# Patient Record
Sex: MALE | Race: WHITE | NOT HISPANIC OR LATINO | Employment: STUDENT | URBAN - METROPOLITAN AREA
[De-identification: names, ages, dates, MRNs, and addresses within clinical notes are randomized per-mention and may not be internally consistent; named-entity substitution may affect disease eponyms.]

---

## 2021-07-07 ENCOUNTER — OFFICE VISIT (OUTPATIENT)
Dept: INTERNAL MEDICINE | Facility: CLINIC | Age: 26
End: 2021-07-07
Payer: COMMERCIAL

## 2021-07-07 VITALS
OXYGEN SATURATION: 98 % | SYSTOLIC BLOOD PRESSURE: 122 MMHG | DIASTOLIC BLOOD PRESSURE: 86 MMHG | TEMPERATURE: 98 F | WEIGHT: 160.69 LBS | HEART RATE: 59 BPM | HEIGHT: 71 IN | BODY MASS INDEX: 22.5 KG/M2

## 2021-07-07 DIAGNOSIS — F41.9 ANXIETY: ICD-10-CM

## 2021-07-07 DIAGNOSIS — Z12.83 SKIN EXAM FOR MALIGNANT NEOPLASM: ICD-10-CM

## 2021-07-07 DIAGNOSIS — Z00.00 ANNUAL PHYSICAL EXAM: Primary | ICD-10-CM

## 2021-07-07 DIAGNOSIS — Z20.2 STD EXPOSURE: ICD-10-CM

## 2021-07-07 PROCEDURE — 3008F PR BODY MASS INDEX (BMI) DOCUMENTED: ICD-10-PCS | Mod: CPTII,S$GLB,, | Performed by: FAMILY MEDICINE

## 2021-07-07 PROCEDURE — 1126F PR PAIN SEVERITY QUANTIFIED, NO PAIN PRESENT: ICD-10-PCS | Mod: S$GLB,,, | Performed by: FAMILY MEDICINE

## 2021-07-07 PROCEDURE — 99999 PR PBB SHADOW E&M-NEW PATIENT-LVL IV: ICD-10-PCS | Mod: PBBFAC,,, | Performed by: FAMILY MEDICINE

## 2021-07-07 PROCEDURE — 3008F BODY MASS INDEX DOCD: CPT | Mod: CPTII,S$GLB,, | Performed by: FAMILY MEDICINE

## 2021-07-07 PROCEDURE — 99385 PREV VISIT NEW AGE 18-39: CPT | Mod: S$GLB,,, | Performed by: FAMILY MEDICINE

## 2021-07-07 PROCEDURE — 1126F AMNT PAIN NOTED NONE PRSNT: CPT | Mod: S$GLB,,, | Performed by: FAMILY MEDICINE

## 2021-07-07 PROCEDURE — 99385 PR PREVENTIVE VISIT,NEW,18-39: ICD-10-PCS | Mod: S$GLB,,, | Performed by: FAMILY MEDICINE

## 2021-07-07 PROCEDURE — 99999 PR PBB SHADOW E&M-NEW PATIENT-LVL IV: CPT | Mod: PBBFAC,,, | Performed by: FAMILY MEDICINE

## 2021-07-12 ENCOUNTER — LAB VISIT (OUTPATIENT)
Dept: LAB | Facility: OTHER | Age: 26
End: 2021-07-12
Attending: FAMILY MEDICINE
Payer: COMMERCIAL

## 2021-07-12 DIAGNOSIS — Z20.2 STD EXPOSURE: ICD-10-CM

## 2021-07-12 DIAGNOSIS — Z00.00 ANNUAL PHYSICAL EXAM: ICD-10-CM

## 2021-07-12 LAB
ALBUMIN SERPL BCP-MCNC: 4.2 G/DL (ref 3.5–5.2)
ALP SERPL-CCNC: 78 U/L (ref 55–135)
ALT SERPL W/O P-5'-P-CCNC: 20 U/L (ref 10–44)
ANION GAP SERPL CALC-SCNC: 9 MMOL/L (ref 8–16)
AST SERPL-CCNC: 46 U/L (ref 10–40)
BASOPHILS # BLD AUTO: 0.06 K/UL (ref 0–0.2)
BASOPHILS NFR BLD: 1.2 % (ref 0–1.9)
BILIRUB SERPL-MCNC: 1.1 MG/DL (ref 0.1–1)
BUN SERPL-MCNC: 14 MG/DL (ref 6–20)
CALCIUM SERPL-MCNC: 8.9 MG/DL (ref 8.7–10.5)
CHLORIDE SERPL-SCNC: 108 MMOL/L (ref 95–110)
CHOLEST SERPL-MCNC: 127 MG/DL (ref 120–199)
CHOLEST/HDLC SERPL: 2.2 {RATIO} (ref 2–5)
CO2 SERPL-SCNC: 24 MMOL/L (ref 23–29)
CREAT SERPL-MCNC: 1 MG/DL (ref 0.5–1.4)
DIFFERENTIAL METHOD: ABNORMAL
EOSINOPHIL # BLD AUTO: 0.4 K/UL (ref 0–0.5)
EOSINOPHIL NFR BLD: 7.5 % (ref 0–8)
ERYTHROCYTE [DISTWIDTH] IN BLOOD BY AUTOMATED COUNT: 11.7 % (ref 11.5–14.5)
EST. GFR  (AFRICAN AMERICAN): >60 ML/MIN/1.73 M^2
EST. GFR  (NON AFRICAN AMERICAN): >60 ML/MIN/1.73 M^2
GLUCOSE SERPL-MCNC: 87 MG/DL (ref 70–110)
HCT VFR BLD AUTO: 42.2 % (ref 40–54)
HCV AB SERPL QL IA: NEGATIVE
HDLC SERPL-MCNC: 58 MG/DL (ref 40–75)
HDLC SERPL: 45.7 % (ref 20–50)
HGB BLD-MCNC: 13.4 G/DL (ref 14–18)
HIV 1+2 AB+HIV1 P24 AG SERPL QL IA: NEGATIVE
HSV1 IGG SERPL QL IA: NEGATIVE
HSV2 IGG SERPL QL IA: NEGATIVE
IMM GRANULOCYTES # BLD AUTO: 0 K/UL (ref 0–0.04)
IMM GRANULOCYTES NFR BLD AUTO: 0 % (ref 0–0.5)
LDLC SERPL CALC-MCNC: 57.8 MG/DL (ref 63–159)
LYMPHOCYTES # BLD AUTO: 2.1 K/UL (ref 1–4.8)
LYMPHOCYTES NFR BLD: 42.6 % (ref 18–48)
MCH RBC QN AUTO: 27.9 PG (ref 27–31)
MCHC RBC AUTO-ENTMCNC: 31.8 G/DL (ref 32–36)
MCV RBC AUTO: 88 FL (ref 82–98)
MONOCYTES # BLD AUTO: 0.5 K/UL (ref 0.3–1)
MONOCYTES NFR BLD: 10.2 % (ref 4–15)
NEUTROPHILS # BLD AUTO: 1.9 K/UL (ref 1.8–7.7)
NEUTROPHILS NFR BLD: 38.5 % (ref 38–73)
NONHDLC SERPL-MCNC: 69 MG/DL
NRBC BLD-RTO: 0 /100 WBC
PLATELET # BLD AUTO: 194 K/UL (ref 150–450)
PMV BLD AUTO: 10.9 FL (ref 9.2–12.9)
POTASSIUM SERPL-SCNC: 3.8 MMOL/L (ref 3.5–5.1)
PROT SERPL-MCNC: 7 G/DL (ref 6–8.4)
RBC # BLD AUTO: 4.81 M/UL (ref 4.6–6.2)
SODIUM SERPL-SCNC: 141 MMOL/L (ref 136–145)
TRIGL SERPL-MCNC: 56 MG/DL (ref 30–150)
TSH SERPL DL<=0.005 MIU/L-ACNC: 3.21 UIU/ML (ref 0.4–4)
WBC # BLD AUTO: 4.91 K/UL (ref 3.9–12.7)

## 2021-07-12 PROCEDURE — 86592 SYPHILIS TEST NON-TREP QUAL: CPT | Performed by: FAMILY MEDICINE

## 2021-07-12 PROCEDURE — 86694 HERPES SIMPLEX NES ANTBDY: CPT | Performed by: FAMILY MEDICINE

## 2021-07-12 PROCEDURE — 80061 LIPID PANEL: CPT | Performed by: FAMILY MEDICINE

## 2021-07-12 PROCEDURE — 85025 COMPLETE CBC W/AUTO DIFF WBC: CPT | Performed by: FAMILY MEDICINE

## 2021-07-12 PROCEDURE — 36415 COLL VENOUS BLD VENIPUNCTURE: CPT | Performed by: FAMILY MEDICINE

## 2021-07-12 PROCEDURE — 80053 COMPREHEN METABOLIC PANEL: CPT | Performed by: FAMILY MEDICINE

## 2021-07-12 PROCEDURE — 86695 HERPES SIMPLEX TYPE 1 TEST: CPT | Performed by: FAMILY MEDICINE

## 2021-07-12 PROCEDURE — 86803 HEPATITIS C AB TEST: CPT | Performed by: FAMILY MEDICINE

## 2021-07-12 PROCEDURE — 87389 HIV-1 AG W/HIV-1&-2 AB AG IA: CPT | Performed by: FAMILY MEDICINE

## 2021-07-12 PROCEDURE — 86696 HERPES SIMPLEX TYPE 2 TEST: CPT | Performed by: FAMILY MEDICINE

## 2021-07-12 PROCEDURE — 84443 ASSAY THYROID STIM HORMONE: CPT | Performed by: FAMILY MEDICINE

## 2021-07-13 ENCOUNTER — OFFICE VISIT (OUTPATIENT)
Dept: DERMATOLOGY | Facility: CLINIC | Age: 26
End: 2021-07-13
Payer: COMMERCIAL

## 2021-07-13 DIAGNOSIS — Z12.83 SCREENING EXAM FOR SKIN CANCER: Primary | ICD-10-CM

## 2021-07-13 DIAGNOSIS — L81.4 LENTIGO: ICD-10-CM

## 2021-07-13 DIAGNOSIS — Z12.83 SKIN EXAM FOR MALIGNANT NEOPLASM: ICD-10-CM

## 2021-07-13 DIAGNOSIS — L20.82 FLEXURAL ECZEMA: ICD-10-CM

## 2021-07-13 DIAGNOSIS — D18.01 CHERRY ANGIOMA: ICD-10-CM

## 2021-07-13 DIAGNOSIS — D22.9 MULTIPLE BENIGN NEVI: ICD-10-CM

## 2021-07-13 LAB — RPR SER QL: NORMAL

## 2021-07-13 PROCEDURE — 99204 OFFICE O/P NEW MOD 45 MIN: CPT | Mod: S$GLB,,, | Performed by: DERMATOLOGY

## 2021-07-13 PROCEDURE — 99999 PR PBB SHADOW E&M-EST. PATIENT-LVL III: CPT | Mod: PBBFAC,,, | Performed by: DERMATOLOGY

## 2021-07-13 PROCEDURE — 1126F AMNT PAIN NOTED NONE PRSNT: CPT | Mod: CPTII,S$GLB,, | Performed by: DERMATOLOGY

## 2021-07-13 PROCEDURE — 99999 PR PBB SHADOW E&M-EST. PATIENT-LVL III: ICD-10-PCS | Mod: PBBFAC,,, | Performed by: DERMATOLOGY

## 2021-07-13 PROCEDURE — 1126F PR PAIN SEVERITY QUANTIFIED, NO PAIN PRESENT: ICD-10-PCS | Mod: CPTII,S$GLB,, | Performed by: DERMATOLOGY

## 2021-07-13 PROCEDURE — 99204 PR OFFICE/OUTPT VISIT, NEW, LEVL IV, 45-59 MIN: ICD-10-PCS | Mod: S$GLB,,, | Performed by: DERMATOLOGY

## 2021-07-13 RX ORDER — ALBUTEROL SULFATE 90 UG/1
2 AEROSOL, METERED RESPIRATORY (INHALATION) EVERY 4 HOURS PRN
COMMUNITY
End: 2022-08-12 | Stop reason: SDUPTHER

## 2021-07-13 RX ORDER — FLUTICASONE PROPIONATE 0.5 MG/G
CREAM TOPICAL 2 TIMES DAILY
Qty: 45 G | Refills: 1 | Status: SHIPPED | OUTPATIENT
Start: 2021-07-13 | End: 2021-07-23

## 2021-07-16 LAB — HSV AB, IGM BY EIA: 0.16 INDEX

## 2022-07-26 ENCOUNTER — TELEPHONE (OUTPATIENT)
Dept: INTERNAL MEDICINE | Facility: CLINIC | Age: 27
End: 2022-07-26
Payer: COMMERCIAL

## 2022-08-12 ENCOUNTER — OFFICE VISIT (OUTPATIENT)
Dept: PRIMARY CARE CLINIC | Facility: CLINIC | Age: 27
End: 2022-08-12
Payer: COMMERCIAL

## 2022-08-12 ENCOUNTER — LAB VISIT (OUTPATIENT)
Dept: LAB | Facility: HOSPITAL | Age: 27
End: 2022-08-12
Attending: FAMILY MEDICINE
Payer: COMMERCIAL

## 2022-08-12 VITALS
SYSTOLIC BLOOD PRESSURE: 110 MMHG | OXYGEN SATURATION: 98 % | WEIGHT: 162.06 LBS | HEIGHT: 71 IN | DIASTOLIC BLOOD PRESSURE: 64 MMHG | HEART RATE: 52 BPM | BODY MASS INDEX: 22.69 KG/M2

## 2022-08-12 DIAGNOSIS — Z13.6 ENCOUNTER FOR LIPID SCREENING FOR CARDIOVASCULAR DISEASE: ICD-10-CM

## 2022-08-12 DIAGNOSIS — Z11.3 ROUTINE SCREENING FOR STI (SEXUALLY TRANSMITTED INFECTION): ICD-10-CM

## 2022-08-12 DIAGNOSIS — Z13.220 ENCOUNTER FOR LIPID SCREENING FOR CARDIOVASCULAR DISEASE: ICD-10-CM

## 2022-08-12 DIAGNOSIS — Z00.00 GENERAL MEDICAL EXAM: ICD-10-CM

## 2022-08-12 DIAGNOSIS — Z11.4 ENCOUNTER FOR SCREENING FOR HUMAN IMMUNODEFICIENCY VIRUS (HIV): ICD-10-CM

## 2022-08-12 DIAGNOSIS — R41.840 ATTENTION OR CONCENTRATION DEFICIT: ICD-10-CM

## 2022-08-12 DIAGNOSIS — J45.20 MILD INTERMITTENT ASTHMA WITHOUT COMPLICATION: ICD-10-CM

## 2022-08-12 DIAGNOSIS — Z11.59 NEED FOR HEPATITIS C SCREENING TEST: ICD-10-CM

## 2022-08-12 DIAGNOSIS — Z00.00 GENERAL MEDICAL EXAM: Primary | ICD-10-CM

## 2022-08-12 DIAGNOSIS — F39 MOOD DISORDER: ICD-10-CM

## 2022-08-12 LAB
ALBUMIN SERPL BCP-MCNC: 4.3 G/DL (ref 3.5–5.2)
ALP SERPL-CCNC: 69 U/L (ref 55–135)
ALT SERPL W/O P-5'-P-CCNC: 17 U/L (ref 10–44)
ANION GAP SERPL CALC-SCNC: 10 MMOL/L (ref 8–16)
AST SERPL-CCNC: 23 U/L (ref 10–40)
BILIRUB SERPL-MCNC: 1 MG/DL (ref 0.1–1)
BUN SERPL-MCNC: 8 MG/DL (ref 6–20)
CALCIUM SERPL-MCNC: 9.5 MG/DL (ref 8.7–10.5)
CHLORIDE SERPL-SCNC: 105 MMOL/L (ref 95–110)
CHOLEST SERPL-MCNC: 113 MG/DL (ref 120–199)
CHOLEST/HDLC SERPL: 2.1 {RATIO} (ref 2–5)
CO2 SERPL-SCNC: 25 MMOL/L (ref 23–29)
CREAT SERPL-MCNC: 0.8 MG/DL (ref 0.5–1.4)
EST. GFR  (NO RACE VARIABLE): >60 ML/MIN/1.73 M^2
GLUCOSE SERPL-MCNC: 66 MG/DL (ref 70–110)
HDLC SERPL-MCNC: 53 MG/DL (ref 40–75)
HDLC SERPL: 46.9 % (ref 20–50)
LDLC SERPL CALC-MCNC: 49.2 MG/DL (ref 63–159)
NONHDLC SERPL-MCNC: 60 MG/DL
POTASSIUM SERPL-SCNC: 3.9 MMOL/L (ref 3.5–5.1)
PROT SERPL-MCNC: 7 G/DL (ref 6–8.4)
SODIUM SERPL-SCNC: 140 MMOL/L (ref 136–145)
TRIGL SERPL-MCNC: 54 MG/DL (ref 30–150)
TSH SERPL DL<=0.005 MIU/L-ACNC: 2.68 UIU/ML (ref 0.4–4)

## 2022-08-12 PROCEDURE — 36415 COLL VENOUS BLD VENIPUNCTURE: CPT | Mod: PN | Performed by: FAMILY MEDICINE

## 2022-08-12 PROCEDURE — 3074F PR MOST RECENT SYSTOLIC BLOOD PRESSURE < 130 MM HG: ICD-10-PCS | Mod: CPTII,S$GLB,, | Performed by: FAMILY MEDICINE

## 2022-08-12 PROCEDURE — 99999 PR PBB SHADOW E&M-EST. PATIENT-LVL IV: CPT | Mod: PBBFAC,,, | Performed by: FAMILY MEDICINE

## 2022-08-12 PROCEDURE — 3078F DIAST BP <80 MM HG: CPT | Mod: CPTII,S$GLB,, | Performed by: FAMILY MEDICINE

## 2022-08-12 PROCEDURE — 87340 HEPATITIS B SURFACE AG IA: CPT | Performed by: FAMILY MEDICINE

## 2022-08-12 PROCEDURE — 80061 LIPID PANEL: CPT | Performed by: FAMILY MEDICINE

## 2022-08-12 PROCEDURE — 99395 PREV VISIT EST AGE 18-39: CPT | Mod: S$GLB,,, | Performed by: FAMILY MEDICINE

## 2022-08-12 PROCEDURE — 3074F SYST BP LT 130 MM HG: CPT | Mod: CPTII,S$GLB,, | Performed by: FAMILY MEDICINE

## 2022-08-12 PROCEDURE — 86803 HEPATITIS C AB TEST: CPT | Performed by: FAMILY MEDICINE

## 2022-08-12 PROCEDURE — 87389 HIV-1 AG W/HIV-1&-2 AB AG IA: CPT | Performed by: FAMILY MEDICINE

## 2022-08-12 PROCEDURE — 3078F PR MOST RECENT DIASTOLIC BLOOD PRESSURE < 80 MM HG: ICD-10-PCS | Mod: CPTII,S$GLB,, | Performed by: FAMILY MEDICINE

## 2022-08-12 PROCEDURE — 1159F PR MEDICATION LIST DOCUMENTED IN MEDICAL RECORD: ICD-10-PCS | Mod: CPTII,S$GLB,, | Performed by: FAMILY MEDICINE

## 2022-08-12 PROCEDURE — 3008F BODY MASS INDEX DOCD: CPT | Mod: CPTII,S$GLB,, | Performed by: FAMILY MEDICINE

## 2022-08-12 PROCEDURE — 86706 HEP B SURFACE ANTIBODY: CPT | Performed by: FAMILY MEDICINE

## 2022-08-12 PROCEDURE — 86592 SYPHILIS TEST NON-TREP QUAL: CPT | Performed by: FAMILY MEDICINE

## 2022-08-12 PROCEDURE — 1160F RVW MEDS BY RX/DR IN RCRD: CPT | Mod: CPTII,S$GLB,, | Performed by: FAMILY MEDICINE

## 2022-08-12 PROCEDURE — 84443 ASSAY THYROID STIM HORMONE: CPT | Performed by: FAMILY MEDICINE

## 2022-08-12 PROCEDURE — 3008F PR BODY MASS INDEX (BMI) DOCUMENTED: ICD-10-PCS | Mod: CPTII,S$GLB,, | Performed by: FAMILY MEDICINE

## 2022-08-12 PROCEDURE — 99395 PR PREVENTIVE VISIT,EST,18-39: ICD-10-PCS | Mod: S$GLB,,, | Performed by: FAMILY MEDICINE

## 2022-08-12 PROCEDURE — 1159F MED LIST DOCD IN RCRD: CPT | Mod: CPTII,S$GLB,, | Performed by: FAMILY MEDICINE

## 2022-08-12 PROCEDURE — 1160F PR REVIEW ALL MEDS BY PRESCRIBER/CLIN PHARMACIST DOCUMENTED: ICD-10-PCS | Mod: CPTII,S$GLB,, | Performed by: FAMILY MEDICINE

## 2022-08-12 PROCEDURE — 99999 PR PBB SHADOW E&M-EST. PATIENT-LVL IV: ICD-10-PCS | Mod: PBBFAC,,, | Performed by: FAMILY MEDICINE

## 2022-08-12 PROCEDURE — 80053 COMPREHEN METABOLIC PANEL: CPT | Performed by: FAMILY MEDICINE

## 2022-08-12 RX ORDER — BUPROPION HYDROCHLORIDE 150 MG/1
150 TABLET ORAL DAILY
Qty: 7 TABLET | Refills: 0 | Status: SHIPPED | OUTPATIENT
Start: 2022-08-12 | End: 2022-08-19

## 2022-08-12 RX ORDER — BUPROPION HYDROCHLORIDE 300 MG/1
300 TABLET ORAL DAILY
Qty: 30 TABLET | Refills: 1 | Status: SHIPPED | OUTPATIENT
Start: 2022-08-12 | End: 2022-09-07

## 2022-08-12 RX ORDER — ALBUTEROL SULFATE 90 UG/1
2 AEROSOL, METERED RESPIRATORY (INHALATION) EVERY 6 HOURS PRN
Qty: 18 G | Refills: 11 | Status: SHIPPED | OUTPATIENT
Start: 2022-08-12 | End: 2023-06-15 | Stop reason: SDUPTHER

## 2022-08-12 NOTE — PATIENT INSTRUCTIONS
Please call [phone] ([facility]); [phone]( [facility]) to schedule your behavioral health/mental health appointment

## 2022-08-13 LAB — RPR SER QL: NORMAL

## 2022-08-15 ENCOUNTER — TELEPHONE (OUTPATIENT)
Dept: FAMILY MEDICINE | Facility: CLINIC | Age: 27
End: 2022-08-15
Payer: COMMERCIAL

## 2022-08-15 DIAGNOSIS — Z00.00 GENERAL MEDICAL EXAM: Primary | ICD-10-CM

## 2022-08-15 LAB — HIV 1+2 AB+HIV1 P24 AG SERPL QL IA: NEGATIVE

## 2022-08-15 NOTE — TELEPHONE ENCOUNTER
Spoke with lab and patient's CBC from 8/12 was clotted and needs to be re-drawn. Please re-enter order and I will reach out to patient to schedule.

## 2022-08-15 NOTE — TELEPHONE ENCOUNTER
----- Message from Paul Matute Jr., MD sent at 8/15/2022  2:26 PM CDT -----  Please schedule patient virtual visit in 6-8 weeks for attention follow up

## 2022-08-15 NOTE — TELEPHONE ENCOUNTER
----- Message from Chuyita Powell sent at 8/15/2022  2:01 AM CDT -----  Regarding: Lab Client Services  Contact: [phone]  Good Morning,     My name is Chuyita Powell I work in the Lab Client Services. We had a problem with some lab work on this patient. If someone from your office could call us at [phone] or ext. [phone] that would be great. Anyone in my department can help.      Thank you

## 2022-08-15 NOTE — PROGRESS NOTES
Subjective:       Patient ID: Arnel Edmond is a 26 y.o. male.    Chief Complaint: Annual Exam    HPI   26-year-old male comes into establish care with new PCP. He reports no chronic conditions. He reports he does not take any medication at present.  He does report that he has been having issues with focusing. He states it has been a long time issue. He has never been evaluated for attention deficit disorder. He states that his mother was recently diagnosed with this. In addition he does report episodes of low mood and episodes of anxiety. He's never been on any treatment for these either.     Review of Systems   Constitutional: Negative for unexpected weight change.   HENT: Negative for ear pain and sore throat.    Eyes: Negative for visual disturbance.   Respiratory: Negative for shortness of breath.    Cardiovascular: Negative for chest pain.   Gastrointestinal: Negative for abdominal pain and blood in stool.   Endocrine: Negative for cold intolerance and heat intolerance.   Genitourinary: Negative for dysuria and frequency.   Integumentary:  Negative for rash.   Neurological: Negative for weakness, numbness and headaches.   Hematological: Negative for adenopathy.   Psychiatric/Behavioral: Positive for decreased concentration and dysphoric mood. Negative for self-injury and suicidal ideas. The patient is nervous/anxious. The patient is not hyperactive.          Objective:      Physical Exam  Vitals reviewed.   Constitutional:       General: He is not in acute distress.  HENT:      Head: Normocephalic and atraumatic.      Right Ear: Ear canal and external ear normal.      Left Ear: Ear canal and external ear normal.      Nose: Nose normal.      Mouth/Throat:      Mouth: Mucous membranes are moist.      Pharynx: No oropharyngeal exudate or posterior oropharyngeal erythema.   Eyes:      Extraocular Movements: Extraocular movements intact.      Conjunctiva/sclera: Conjunctivae normal.      Pupils: Pupils are  equal, round, and reactive to light.   Cardiovascular:      Rate and Rhythm: Normal rate and regular rhythm.      Pulses: Normal pulses.      Heart sounds: Normal heart sounds.   Pulmonary:      Effort: Pulmonary effort is normal. No respiratory distress.      Breath sounds: No wheezing or rales.   Abdominal:      General: Abdomen is flat. Bowel sounds are normal. There is no distension.      Palpations: Abdomen is soft.      Tenderness: There is no abdominal tenderness. There is no guarding.   Musculoskeletal:      Cervical back: Normal range of motion. No rigidity or tenderness.   Lymphadenopathy:      Cervical: No cervical adenopathy.   Skin:     General: Skin is warm.      Capillary Refill: Capillary refill takes less than 2 seconds.   Neurological:      Mental Status: He is alert and oriented to person, place, and time.      Cranial Nerves: No cranial nerve deficit.      Sensory: No sensory deficit.   Psychiatric:         Mood and Affect: Mood normal.         Behavior: Behavior normal.         Assessment:       Problem List Items Addressed This Visit    None     Visit Diagnoses     General medical exam    -  Primary    Relevant Orders    Comprehensive Metabolic Panel (Completed)    Urinalysis (Completed)    Encounter for lipid screening for cardiovascular disease        Relevant Orders    Lipid Panel (Completed)    Encounter for screening for human immunodeficiency virus (HIV)        Relevant Orders    HIV 1/2 Ag/Ab (4th Gen) (Completed)    Need for hepatitis C screening test        Relevant Orders    Hepatitis C Antibody    Routine screening for STI (sexually transmitted infection)        Relevant Orders    C. trachomatis/N. gonorrhoeae by AMP DNA [facility]; Urine    RPR (Completed)    Hepatitis B Surface Ab, Qualitative    Hepatitis B Surface Antigen    Mild intermittent asthma without complication        Relevant Medications    albuterol (PROAIR HFA) 90 mcg/actuation inhaler    Attention or concentration  deficit        Relevant Medications    buPROPion (WELLBUTRIN XL) 150 MG TB24 tablet    buPROPion (WELLBUTRIN XL) 300 MG 24 hr tablet    Other Relevant Orders    TSH (Completed)    Ambulatory referral/consult to Psychiatry    Mood disorder        Relevant Medications    buPROPion (WELLBUTRIN XL) 150 MG TB24 tablet    buPROPion (WELLBUTRIN XL) 300 MG 24 hr tablet          Plan:       Arnel was seen today for annual exam.    Diagnoses and all orders for this visit:    General medical exam  -     Comprehensive Metabolic Panel; Future  -     Cancel: CBC Auto Differential; Future  -     Urinalysis; Future  Age appropriate recommendations reviewed.  Screening labs ordered.    Encounter for lipid screening for cardiovascular disease  -     Lipid Panel; Future  Screening lipid panel ordered.    Encounter for screening for human immunodeficiency virus (HIV)  -     HIV 1/2 Ag/Ab (4th Gen); Future  Screen for HIV as per USPSTF guidelines    Need for hepatitis C screening test  -     Hepatitis C Antibody; Future  Screen for HCV as per USPSTF guidelines    Routine screening for STI (sexually transmitted infection)  -     C. trachomatis/N. gonorrhoeae by AMP DNA [facility]; Urine; Future  -     RPR; Future  -     Hepatitis B Surface Ab, Qualitative; Future  -     Hepatitis B Surface Antigen; Future  Screen for STI    Mild intermittent asthma without complication  -     albuterol (PROAIR HFA) 90 mcg/actuation inhaler; Inhale 2 puffs into the lungs every 6 (six) hours as needed for Wheezing. Rescue  Patient educated on correct use of inhaler via video: https://www.[employer].com/watch?v=[email or web address].  Patient was able to explain back how to use inhaler.    Attention or concentration deficit/Mood disorder  -     TSH; Future  -     buPROPion (WELLBUTRIN XL) 150 MG TB24 tablet; Take 1 tablet (150 mg total) by mouth once daily. Then increase to 300 mg daily for 7 days  -     buPROPion (WELLBUTRIN XL) 300 MG 24 hr tablet; Take 1 tablet (300 mg  total) by mouth once daily.  -     Ambulatory referral/consult to Psychiatry; Future  Discussed that diagnosis of ADHD requires testing  Referral to psych placed  In the meantime discussed starting wellbutrin for symptom management.

## 2022-08-16 LAB
HBV SURFACE AB SER-ACNC: NEGATIVE M[IU]/ML
HBV SURFACE AG SERPL QL IA: NEGATIVE
HCV AB SERPL QL IA: NEGATIVE

## 2022-08-25 ENCOUNTER — LAB VISIT (OUTPATIENT)
Dept: LAB | Facility: HOSPITAL | Age: 27
End: 2022-08-25
Attending: FAMILY MEDICINE
Payer: COMMERCIAL

## 2022-08-25 DIAGNOSIS — Z00.00 GENERAL MEDICAL EXAM: ICD-10-CM

## 2022-08-25 LAB
BASOPHILS # BLD AUTO: 0.06 K/UL (ref 0–0.2)
BASOPHILS NFR BLD: 1.3 % (ref 0–1.9)
DIFFERENTIAL METHOD: NORMAL
EOSINOPHIL # BLD AUTO: 0.3 K/UL (ref 0–0.5)
EOSINOPHIL NFR BLD: 7.5 % (ref 0–8)
ERYTHROCYTE [DISTWIDTH] IN BLOOD BY AUTOMATED COUNT: 11.9 % (ref 11.5–14.5)
HCT VFR BLD AUTO: 42.7 % (ref 40–54)
HGB BLD-MCNC: 14.3 G/DL (ref 14–18)
IMM GRANULOCYTES # BLD AUTO: 0.01 K/UL (ref 0–0.04)
IMM GRANULOCYTES NFR BLD AUTO: 0.2 % (ref 0–0.5)
LYMPHOCYTES # BLD AUTO: 1.6 K/UL (ref 1–4.8)
LYMPHOCYTES NFR BLD: 34.8 % (ref 18–48)
MCH RBC QN AUTO: 28.8 PG (ref 27–31)
MCHC RBC AUTO-ENTMCNC: 33.5 G/DL (ref 32–36)
MCV RBC AUTO: 86 FL (ref 82–98)
MONOCYTES # BLD AUTO: 0.4 K/UL (ref 0.3–1)
MONOCYTES NFR BLD: 7.7 % (ref 4–15)
NEUTROPHILS # BLD AUTO: 2.2 K/UL (ref 1.8–7.7)
NEUTROPHILS NFR BLD: 48.5 % (ref 38–73)
NRBC BLD-RTO: 0 /100 WBC
PLATELET # BLD AUTO: 198 K/UL (ref 150–450)
PMV BLD AUTO: 11.3 FL (ref 9.2–12.9)
RBC # BLD AUTO: 4.97 M/UL (ref 4.6–6.2)
WBC # BLD AUTO: 4.54 K/UL (ref 3.9–12.7)

## 2022-08-25 PROCEDURE — 36415 COLL VENOUS BLD VENIPUNCTURE: CPT | Mod: PN | Performed by: FAMILY MEDICINE

## 2022-08-25 PROCEDURE — 85025 COMPLETE CBC W/AUTO DIFF WBC: CPT | Performed by: FAMILY MEDICINE

## 2022-08-27 DIAGNOSIS — Z23 NEED FOR VACCINATION: Primary | ICD-10-CM

## 2022-08-27 DIAGNOSIS — Z11.3 ROUTINE SCREENING FOR STI (SEXUALLY TRANSMITTED INFECTION): Primary | ICD-10-CM

## 2022-08-27 RX ORDER — HEPATITIS B VACCINE (RECOMBINANT) ADJUVANTED 20 UG/.5ML
INJECTION, SOLUTION INTRAMUSCULAR
Qty: 0.5 ML | Refills: 1 | Status: SHIPPED | OUTPATIENT
Start: 2022-08-27 | End: 2022-10-19 | Stop reason: SDUPTHER

## 2022-10-19 ENCOUNTER — OFFICE VISIT (OUTPATIENT)
Dept: FAMILY MEDICINE | Facility: CLINIC | Age: 27
End: 2022-10-19
Payer: COMMERCIAL

## 2022-10-19 DIAGNOSIS — F39 MOOD DISORDER: ICD-10-CM

## 2022-10-19 DIAGNOSIS — R41.840 ATTENTION OR CONCENTRATION DEFICIT: Primary | ICD-10-CM

## 2022-10-19 DIAGNOSIS — Z23 NEED FOR VACCINATION: ICD-10-CM

## 2022-10-19 PROCEDURE — 99214 PR OFFICE/OUTPT VISIT, EST, LEVL IV, 30-39 MIN: ICD-10-PCS | Mod: 95,,, | Performed by: FAMILY MEDICINE

## 2022-10-19 PROCEDURE — 99214 OFFICE O/P EST MOD 30 MIN: CPT | Mod: 95,,, | Performed by: FAMILY MEDICINE

## 2022-10-19 PROCEDURE — 1159F PR MEDICATION LIST DOCUMENTED IN MEDICAL RECORD: ICD-10-PCS | Mod: CPTII,95,, | Performed by: FAMILY MEDICINE

## 2022-10-19 PROCEDURE — 1160F RVW MEDS BY RX/DR IN RCRD: CPT | Mod: CPTII,95,, | Performed by: FAMILY MEDICINE

## 2022-10-19 PROCEDURE — 1160F PR REVIEW ALL MEDS BY PRESCRIBER/CLIN PHARMACIST DOCUMENTED: ICD-10-PCS | Mod: CPTII,95,, | Performed by: FAMILY MEDICINE

## 2022-10-19 PROCEDURE — 1159F MED LIST DOCD IN RCRD: CPT | Mod: CPTII,95,, | Performed by: FAMILY MEDICINE

## 2022-10-19 RX ORDER — BUPROPION HYDROCHLORIDE 300 MG/1
300 TABLET ORAL DAILY
Qty: 90 TABLET | Refills: 1 | Status: SHIPPED | OUTPATIENT
Start: 2022-10-19 | End: 2023-06-10

## 2022-10-19 RX ORDER — HEPATITIS B VACCINE (RECOMBINANT) ADJUVANTED 20 UG/.5ML
INJECTION, SOLUTION INTRAMUSCULAR
Qty: 0.5 ML | Refills: 1 | Status: SHIPPED | OUTPATIENT
Start: 2022-10-19 | End: 2023-09-19 | Stop reason: SDUPTHER

## 2022-10-23 PROBLEM — R41.840 ATTENTION OR CONCENTRATION DEFICIT: Status: ACTIVE | Noted: 2022-10-23

## 2022-10-23 PROBLEM — F39 MOOD DISORDER: Status: ACTIVE | Noted: 2022-10-23

## 2022-10-23 NOTE — PROGRESS NOTES
The patient location is: [place], [place]  The chief complaint leading to consultation is: ADD/Mood    Visit type: audiovisual    Face to Face time with patient: 12 minutes  15 minutes of total time spent on the encounter, which includes face to face time and non-face to face time preparing to see the patient (eg, review of tests), Obtaining and/or reviewing separately obtained history, Documenting clinical information in the electronic or other health record, Independently interpreting results (not separately reported) and communicating results to the patient/family/caregiver, or Care coordination (not separately reported).         Each patient to whom he or she provides medical services by telemedicine is:  (1) informed of the relationship between the physician and patient and the respective role of any other health care provider with respect to management of the patient; and (2) notified that he or she may decline to receive medical services by telemedicine and may withdraw from such care at any time.    Notes:   Subjective:       Patient ID: Arnel Edmond is a 26 y.o. male.    Chief Complaint: ADHD    26 year old male makes a virtual visit to follow up on attention deficit and mood. States he has been taking Wellbutrin and he feels he is doing much better. States it has not completely solved concentration issues but he is now able to do what he can to manage his attention deficit. Also reports mood is much better. States others have also noticed these changes. He denies any side effects.        Review of Systems   Constitutional:  Negative for activity change and unexpected weight change.   HENT:  Negative for hearing loss, rhinorrhea and trouble swallowing.    Eyes:  Negative for discharge and visual disturbance.   Respiratory:  Negative for chest tightness and wheezing.    Cardiovascular:  Negative for chest pain and palpitations.   Gastrointestinal:  Negative for blood in stool, constipation, diarrhea and  vomiting.   Endocrine: Negative for polydipsia and polyuria.   Genitourinary:  Negative for difficulty urinating, hematuria and urgency.   Musculoskeletal:  Negative for arthralgias, joint swelling and neck pain.   Neurological:  Negative for weakness and headaches.   Psychiatric/Behavioral:  Negative for confusion and dysphoric mood.        Objective:      Physical Exam  Pulmonary:      Effort: Pulmonary effort is normal.   Neurological:      Mental Status: He is alert.   Psychiatric:         Mood and Affect: Mood normal.         Behavior: Behavior normal.         Thought Content: Thought content normal.         Cognition and Memory: Cognition normal.       No visits with results within 1 Month(s) from this visit.   Latest known visit with results is:   Lab Visit on 08/25/2022   Component Date Value Ref Range Status    WBC 08/25/2022 4.54  3.90 - 12.70 K/uL Final    RBC 08/25/2022 4.97  4.60 - 6.20 M/uL Final    Hemoglobin 08/25/2022 14.3  14.0 - 18.0 g/dL Final    Hematocrit 08/25/2022 42.7  40.0 - 54.0 % Final    MCV 08/25/2022 86  82 - 98 fL Final    MCH 08/25/2022 28.8  27.0 - 31.0 pg Final    MCHC 08/25/2022 33.5  32.0 - 36.0 g/dL Final    RDW 08/25/2022 11.9  11.5 - 14.5 % Final    Platelets 08/25/2022 198  150 - 450 K/uL Final    MPV 08/25/2022 11.3  9.2 - 12.9 fL Final    Immature Granulocytes 08/25/2022 0.2  0.0 - 0.5 % Final    Gran # (ANC) 08/25/2022 2.2  1.8 - 7.7 K/uL Final    Immature Grans (Abs) 08/25/2022 0.01  0.00 - 0.04 K/uL Final    Comment: Mild elevation in immature granulocytes is non specific and   can be seen in a variety of conditions including stress response,   acute inflammation, trauma and pregnancy. Correlation with other   laboratory and clinical findings is essential.      Lymph # 08/25/2022 1.6  1.0 - 4.8 K/uL Final    Mono # 08/25/2022 0.4  0.3 - 1.0 K/uL Final    Eos # 08/25/2022 0.3  0.0 - 0.5 K/uL Final    Baso # 08/25/2022 0.06  0.00 - 0.20 K/uL Final    nRBC 08/25/2022 0  0  /100 WBC Final    Gran % 08/25/2022 48.5  38.0 - 73.0 % Final    Lymph % 08/25/2022 34.8  18.0 - 48.0 % Final    Mono % 08/25/2022 7.7  4.0 - 15.0 % Final    Eosinophil % 08/25/2022 7.5  0.0 - 8.0 % Final    Basophil % 08/25/2022 1.3  0.0 - 1.9 % Final    Differential Method 08/25/2022 Automated   Final       Assessment:       Problem List Items Addressed This Visit    None  Visit Diagnoses       Attention or concentration deficit    -  Primary    Relevant Medications    buPROPion (WELLBUTRIN XL) 300 MG 24 hr tablet    Mood disorder        Relevant Medications    buPROPion (WELLBUTRIN XL) 300 MG 24 hr tablet    Need for vaccination        Relevant Medications    hepatitis B vacc-CpG 1018, PF, (HEPLISAV-B, PF,) 20 mcg/0.5 mL Syrg              Plan:       Arnel was seen today for adhd.    Diagnoses and all orders for this visit:    Attention or concentration deficit/Mood disorder  -     buPROPion (WELLBUTRIN XL) 300 MG 24 hr tablet; Take 1 tablet (300 mg total) by mouth once daily.  Doing much better.   We discussed staying on current dose or potentially increasing to 450 mg  States he would like to stay on current dose.    Need for vaccination  -     hepatitis B vacc-CpG 1018, PF, (HEPLISAV-B, PF,) 20 mcg/0.5 mL Syrg; 0.5 ml IM x 2 doses at 0 and 1-2 months  -     hpv vaccine,9-sushil (GARDASIL 9, PF,) 0.5 mL Syrg; Inject 0.5 mLs into the muscle once. for 1 dose  Recommended Heplisav series- Rx sent to pharmacy  Also needs last HPV dose- Rx sent

## 2023-06-10 DIAGNOSIS — F39 MOOD DISORDER: ICD-10-CM

## 2023-06-10 DIAGNOSIS — R41.840 ATTENTION OR CONCENTRATION DEFICIT: ICD-10-CM

## 2023-06-10 RX ORDER — BUPROPION HYDROCHLORIDE 300 MG/1
TABLET ORAL
Qty: 90 TABLET | Refills: 0 | Status: SHIPPED | OUTPATIENT
Start: 2023-06-10 | End: 2023-09-13

## 2023-06-10 NOTE — TELEPHONE ENCOUNTER
Refill Decision Note   Arnelrebekah Estevezfield  is requesting a refill authorization.  Brief Assessment and Rationale for Refill:  Approve     Medication Therapy Plan:         Comments:     Note composed:12:40 PM 06/10/2023

## 2023-06-10 NOTE — TELEPHONE ENCOUNTER
No care due was identified.  [facility] Embedded Care Due Messages. Reference number: [id number].   6/10/2023 8:49:47 AM CDT

## 2023-06-15 ENCOUNTER — OFFICE VISIT (OUTPATIENT)
Dept: INTERNAL MEDICINE | Facility: CLINIC | Age: 28
End: 2023-06-15
Payer: COMMERCIAL

## 2023-06-15 DIAGNOSIS — J45.20 MILD INTERMITTENT ASTHMA WITHOUT COMPLICATION: ICD-10-CM

## 2023-06-15 PROCEDURE — 99214 OFFICE O/P EST MOD 30 MIN: CPT | Mod: 95,,, | Performed by: STUDENT IN AN ORGANIZED HEALTH CARE EDUCATION/TRAINING PROGRAM

## 2023-06-15 PROCEDURE — 99214 PR OFFICE/OUTPT VISIT, EST, LEVL IV, 30-39 MIN: ICD-10-PCS | Mod: 95,,, | Performed by: STUDENT IN AN ORGANIZED HEALTH CARE EDUCATION/TRAINING PROGRAM

## 2023-06-15 RX ORDER — IPRATROPIUM BROMIDE AND ALBUTEROL SULFATE 2.5; .5 MG/3ML; MG/3ML
3 SOLUTION RESPIRATORY (INHALATION) EVERY 6 HOURS PRN
Qty: 75 ML | Refills: 0 | Status: SHIPPED | OUTPATIENT
Start: 2023-06-15 | End: 2024-06-14

## 2023-06-15 RX ORDER — PREDNISONE 20 MG/1
20 TABLET ORAL 2 TIMES DAILY
Qty: 8 TABLET | Refills: 0 | Status: SHIPPED | OUTPATIENT
Start: 2023-06-15 | End: 2023-06-19

## 2023-06-15 RX ORDER — ALBUTEROL SULFATE 90 UG/1
2 AEROSOL, METERED RESPIRATORY (INHALATION) EVERY 6 HOURS PRN
Qty: 18 G | Refills: 1 | Status: SHIPPED | OUTPATIENT
Start: 2023-06-15

## 2023-06-15 NOTE — PROGRESS NOTES
Subjective:      Chief Complaint: Cough     Cough  This is a new problem. The current episode started 1 to 4 weeks ago. The problem has been waxing and waning. The problem occurs every few minutes. The cough is Non-productive. Associated symptoms include shortness of breath. Pertinent negatives include no chest pain, chills, ear congestion, ear pain, fever, headaches, heartburn, hemoptysis, myalgias, nasal congestion, postnasal drip, rash, rhinorrhea, sore throat, sweats, weight loss or wheezing. Nothing aggravates the symptoms. Risk factors for lung disease include animal exposure. He has tried OTC cough suppressant and a beta-agonist inhaler for the symptoms. The treatment provided mild relief. His past medical history is significant for asthma and environmental allergies. There is no history of bronchiectasis, bronchitis, COPD, emphysema or pneumonia.   The patient location is:  patient's home  The chief complaint leading to consultation is: cough   Visit type: Virtual visit with synchronous audio and video  Total time spent with patient: 15 mins   Each patient to whom he or she provides medical services by telemedicine is:  (1) informed of the relationship between the physician and patient and the respective role of any other health care provider with respect to management of the patient; and (2) notified that he or she may decline to receive medical services by telemedicine and may withdraw from such care at any time.  Patient agreed to this telemedicine visit today in an effort to avoid face-to-face visits due to the current COVID 19 pandemic.    Mr. Arnel Edmond is a 28 yo M, new to me but established with a colleague, with childhood asthma presenting via telemedicine for evaluation of persistent cough:     Cough:  -  reports having had  nonspecific viral URI symptoms ( laryngitis, pharyngitis, cough, etc.) approximately 2 weeks ago which have all resolved with exception of persistent nonproductive cough   for which he is requesting empiric treatment  -  does have a history of childhood asthma but is not currently reporting shortness of breath or wheezing; that said, his symptoms are improving with use of albuterol inhaler (several years old)    Review of Systems   Constitutional:  Negative for chills, fever and weight loss.   HENT:  Negative for ear pain, postnasal drip, rhinorrhea and sore throat.    Respiratory:  Positive for cough and shortness of breath. Negative for hemoptysis and wheezing.    Cardiovascular:  Negative for chest pain.   Gastrointestinal:  Negative for heartburn.   Musculoskeletal:  Negative for myalgias.   Integumentary:  Negative for rash.   Allergic/Immunologic: Positive for environmental allergies.   Neurological:  Negative for headaches.       Objective:      There were no vitals filed for this visit.   Physical Exam  Vitals reviewed: Unable to obtain given constraints of telemedicine.     Current Outpatient Medications on File Prior to Visit   Medication Sig Dispense Refill    buPROPion (WELLBUTRIN XL) 300 MG 24 hr tablet TAKE 1 TABLET BY MOUTH EVERY DAY 90 tablet 0    fluticasone propionate (CUTIVATE) 0.05 % cream Apply topically 2 (two) times daily. for 10 days (Patient not taking: Reported on 8/12/2022) 45 g 1    hepatitis B vacc-CpG 1018, PF, (HEPLISAV-B, PF,) 20 mcg/0.5 mL Syrg 0.5 ml IM x 2 doses at 0 and 1-2 months 0.5 mL 1    [DISCONTINUED] albuterol (PROAIR HFA) 90 mcg/actuation inhaler Inhale 2 puffs into the lungs every 6 (six) hours as needed for Wheezing. Rescue 18 g 11     No current facility-administered medications on file prior to visit.         Assessment:       1. Mild intermittent asthma without complication        Plan:       Mild intermittent asthma without complication  -     albuterol (PROAIR HFA) 90 mcg/actuation inhaler; Inhale 2 puffs into the lungs every 6 (six) hours as needed for Wheezing. Rescue  Dispense: 18 g; Refill: 1   -  presumed secondary to residual  viral inflammation +/-  triggering underlying asthmatic episode    - Treat empirically with course of oral steroids and p.r.n. albuterol/ ipratropium ( metered-dose inhaler and nebulizer solution respectively).     - If symptoms are not sufficiently suppressed/resolved within 1 week, will require more formal evaluation ( in-person auscultation and imaging)     Other orders  -     predniSONE (DELTASONE) 20 MG tablet; Take 1 tablet (20 mg total) by mouth 2 (two) times daily. for 4 days  Dispense: 8 tablet; Refill: 0  -     albuterol-ipratropium (DUO-NEB) 2.5 mg-0.5 mg/3 mL nebulizer solution; Take 3 mLs by nebulization every 6 (six) hours as needed for Wheezing. Rescue  Dispense: 75 mL; Refill: 0

## 2023-06-21 ENCOUNTER — TELEPHONE (OUTPATIENT)
Dept: FAMILY MEDICINE | Facility: CLINIC | Age: 28
End: 2023-06-21
Payer: COMMERCIAL

## 2023-06-22 NOTE — TELEPHONE ENCOUNTER
One time refill on Wellbutrin sent in.  However patient will need to be seen in August/Sept for annual for further continuation.  Our please let patient know that I am no longer working at [facility] and if he would like to continue with me would need to schedule in person in [place].  Otherwise can schedule appointment with available provider in the [place].

## 2023-09-13 DIAGNOSIS — F39 MOOD DISORDER: ICD-10-CM

## 2023-09-13 DIAGNOSIS — R41.840 ATTENTION OR CONCENTRATION DEFICIT: ICD-10-CM

## 2023-09-13 RX ORDER — BUPROPION HYDROCHLORIDE 300 MG/1
TABLET ORAL
Qty: 30 TABLET | Refills: 0 | Status: SHIPPED | OUTPATIENT
Start: 2023-09-13 | End: 2023-09-19 | Stop reason: SDUPTHER

## 2023-09-13 NOTE — TELEPHONE ENCOUNTER
No care due was identified.  [facility] Embedded Care Due Messages. Reference number: [id number].   9/13/2023 8:32:43 AM CDT

## 2023-09-13 NOTE — TELEPHONE ENCOUNTER
Refill Routing Note   Medication(s) are not appropriate for processing by [facility] for the following reason(s):      Required vitals outdated    [facility] action(s):  Defer Care Due:  None identified            Appointments  past 12m or future 3m with PCP    Date Provider   Last Visit   10/19/2022 Paul Matute Jr., MD   Next Visit   9/19/2023 Paul Matute Jr., MD   ED visits in past 90 days: 0        Note composed:10:47 AM 09/13/2023

## 2023-09-14 NOTE — TELEPHONE ENCOUNTER
Patient has not seen me in 11 months.  Thirty day refill sent.  We will need to make an appointment prior to the 30 days ending. Can be virtual

## 2023-09-14 NOTE — TELEPHONE ENCOUNTER
Left message for pt 30 supply of requested medication was sent to the pharmacy, but he needs an appt before he runs out and we can do a virtual appt.

## 2023-09-19 ENCOUNTER — OFFICE VISIT (OUTPATIENT)
Dept: FAMILY MEDICINE | Facility: CLINIC | Age: 28
End: 2023-09-19
Payer: COMMERCIAL

## 2023-09-19 VITALS
DIASTOLIC BLOOD PRESSURE: 70 MMHG | OXYGEN SATURATION: 97 % | BODY MASS INDEX: 22.72 KG/M2 | TEMPERATURE: 100 F | HEART RATE: 86 BPM | RESPIRATION RATE: 18 BRPM | WEIGHT: 162.25 LBS | HEIGHT: 71 IN | SYSTOLIC BLOOD PRESSURE: 128 MMHG

## 2023-09-19 DIAGNOSIS — Z23 NEED FOR VACCINATION: ICD-10-CM

## 2023-09-19 DIAGNOSIS — Z13.220 ENCOUNTER FOR LIPID SCREENING FOR CARDIOVASCULAR DISEASE: ICD-10-CM

## 2023-09-19 DIAGNOSIS — Z11.3 ROUTINE SCREENING FOR STI (SEXUALLY TRANSMITTED INFECTION): ICD-10-CM

## 2023-09-19 DIAGNOSIS — Z11.4 ENCOUNTER FOR SCREENING FOR HIV: ICD-10-CM

## 2023-09-19 DIAGNOSIS — R41.840 ATTENTION OR CONCENTRATION DEFICIT: ICD-10-CM

## 2023-09-19 DIAGNOSIS — F39 MOOD DISORDER: ICD-10-CM

## 2023-09-19 DIAGNOSIS — Z13.6 ENCOUNTER FOR LIPID SCREENING FOR CARDIOVASCULAR DISEASE: ICD-10-CM

## 2023-09-19 DIAGNOSIS — A08.4 VIRAL GASTROENTERITIS: ICD-10-CM

## 2023-09-19 DIAGNOSIS — Z00.00 GENERAL MEDICAL EXAM: Primary | ICD-10-CM

## 2023-09-19 DIAGNOSIS — R21 RASH: ICD-10-CM

## 2023-09-19 PROCEDURE — 99395 PR PREVENTIVE VISIT,EST,18-39: ICD-10-PCS | Mod: S$GLB,,, | Performed by: FAMILY MEDICINE

## 2023-09-19 PROCEDURE — 3074F PR MOST RECENT SYSTOLIC BLOOD PRESSURE < 130 MM HG: ICD-10-PCS | Mod: CPTII,S$GLB,, | Performed by: FAMILY MEDICINE

## 2023-09-19 PROCEDURE — 3074F SYST BP LT 130 MM HG: CPT | Mod: CPTII,S$GLB,, | Performed by: FAMILY MEDICINE

## 2023-09-19 PROCEDURE — 1159F MED LIST DOCD IN RCRD: CPT | Mod: CPTII,S$GLB,, | Performed by: FAMILY MEDICINE

## 2023-09-19 PROCEDURE — 99999 PR PBB SHADOW E&M-EST. PATIENT-LVL IV: ICD-10-PCS | Mod: PBBFAC,,, | Performed by: FAMILY MEDICINE

## 2023-09-19 PROCEDURE — 3078F DIAST BP <80 MM HG: CPT | Mod: CPTII,S$GLB,, | Performed by: FAMILY MEDICINE

## 2023-09-19 PROCEDURE — 1160F RVW MEDS BY RX/DR IN RCRD: CPT | Mod: CPTII,S$GLB,, | Performed by: FAMILY MEDICINE

## 2023-09-19 PROCEDURE — 3078F PR MOST RECENT DIASTOLIC BLOOD PRESSURE < 80 MM HG: ICD-10-PCS | Mod: CPTII,S$GLB,, | Performed by: FAMILY MEDICINE

## 2023-09-19 PROCEDURE — 3008F BODY MASS INDEX DOCD: CPT | Mod: CPTII,S$GLB,, | Performed by: FAMILY MEDICINE

## 2023-09-19 PROCEDURE — 99395 PREV VISIT EST AGE 18-39: CPT | Mod: S$GLB,,, | Performed by: FAMILY MEDICINE

## 2023-09-19 PROCEDURE — 99999 PR PBB SHADOW E&M-EST. PATIENT-LVL IV: CPT | Mod: PBBFAC,,, | Performed by: FAMILY MEDICINE

## 2023-09-19 PROCEDURE — 3008F PR BODY MASS INDEX (BMI) DOCUMENTED: ICD-10-PCS | Mod: CPTII,S$GLB,, | Performed by: FAMILY MEDICINE

## 2023-09-19 PROCEDURE — 1160F PR REVIEW ALL MEDS BY PRESCRIBER/CLIN PHARMACIST DOCUMENTED: ICD-10-PCS | Mod: CPTII,S$GLB,, | Performed by: FAMILY MEDICINE

## 2023-09-19 PROCEDURE — 1159F PR MEDICATION LIST DOCUMENTED IN MEDICAL RECORD: ICD-10-PCS | Mod: CPTII,S$GLB,, | Performed by: FAMILY MEDICINE

## 2023-09-19 RX ORDER — BUPROPION HYDROCHLORIDE 300 MG/1
300 TABLET ORAL DAILY
Qty: 90 TABLET | Refills: 3 | Status: SHIPPED | OUTPATIENT
Start: 2023-09-19

## 2023-09-19 RX ORDER — NAFTIFINE HYDROCHLORIDE 20 MG/G
CREAM TOPICAL
Qty: 60 G | Refills: 0 | Status: SHIPPED | OUTPATIENT
Start: 2023-09-19

## 2023-09-19 RX ORDER — DIPHENOXYLATE HYDROCHLORIDE AND ATROPINE SULFATE 2.5; .025 MG/1; MG/1
2 TABLET ORAL 4 TIMES DAILY PRN
Qty: 30 TABLET | Refills: 0 | Status: SHIPPED | OUTPATIENT
Start: 2023-09-19 | End: 2023-09-29

## 2023-09-19 RX ORDER — HEPATITIS B VACCINE (RECOMBINANT) ADJUVANTED 20 UG/.5ML
INJECTION, SOLUTION INTRAMUSCULAR
Qty: 0.5 ML | Refills: 1 | Status: SHIPPED | OUTPATIENT
Start: 2023-09-19

## 2023-09-19 NOTE — PROGRESS NOTES
"  Patient Name: Arnel Edmond    : 1995  MRN: [id number]      Subjective:     Patient ID: Arnel is a 27 y.o. male    Chief Complaint:  Annual Exam and Abdominal Cramping (Food poisoning that started today)    27-year-old male presents for annual health maintenance exam.    In addition, he reports needing refills on his bupropion.  He reports that this has helped with his attention and his mood.  No significant side effect reported.    He also reports that he is having some abdominal cramping and changes in stool which started today.  States that yesterday he had lunch at a food truck yesterday.  Reports having a temp 99.5, and felt feverish.  States he is having some trouble keeping food down.  He is able to drink.         Review of Systems   Constitutional:  Negative for unexpected weight change.   HENT:  Negative for ear pain and sore throat.    Eyes:  Negative for visual disturbance.   Respiratory:  Negative for shortness of breath.    Cardiovascular:  Negative for chest pain.   Gastrointestinal:  Positive for abdominal pain and change in bowel habit. Negative for blood in stool.   Endocrine: Negative for cold intolerance and heat intolerance.   Integumentary:  Positive for rash.   Neurological:  Negative for weakness and numbness.   Hematological:  Negative for adenopathy.   Psychiatric/Behavioral:  Negative for suicidal ideas.         Objective:   /70 (BP Location: Left arm, Patient Position: Sitting, BP Method: Small (Manual))   Pulse 86   Temp 99.5 °F (37.5 °C) (Oral)   Resp 18   Ht 5' 11" (1.803 m)   Wt 73.6 kg (162 lb 4.1 oz)   SpO2 97%   BMI 22.63 kg/m²     Physical Exam  Vitals reviewed.   Constitutional:       General: He is not in acute distress.  HENT:      Head: Normocephalic and atraumatic.      Right Ear: Ear canal and external ear normal.      Left Ear: Ear canal and external ear normal.      Nose: Nose normal.      Mouth/Throat:      Mouth: Mucous membranes are moist.      " Pharynx: No oropharyngeal exudate or posterior oropharyngeal erythema.   Eyes:      Extraocular Movements: Extraocular movements intact.      Conjunctiva/sclera: Conjunctivae normal.      Pupils: Pupils are equal, round, and reactive to light.   Cardiovascular:      Rate and Rhythm: Normal rate and regular rhythm.      Pulses: Normal pulses.      Heart sounds: Normal heart sounds.   Pulmonary:      Effort: Pulmonary effort is normal. No respiratory distress.      Breath sounds: No wheezing or rales.   Abdominal:      General: Abdomen is flat. Bowel sounds are normal. There is no distension.      Palpations: Abdomen is soft.      Tenderness: There is no abdominal tenderness. There is no guarding.   Musculoskeletal:      Cervical back: Normal range of motion. No rigidity or tenderness.   Lymphadenopathy:      Cervical: No cervical adenopathy.   Skin:     General: Skin is warm.      Capillary Refill: Capillary refill takes less than 2 seconds.   Neurological:      Mental Status: He is alert and oriented to person, place, and time.      Cranial Nerves: No cranial nerve deficit.      Sensory: No sensory deficit.   Psychiatric:         Mood and Affect: Mood normal.         Behavior: Behavior normal.        Assessment        ICD-10-CM ICD-9-CM   1. General medical exam  Z00.00 V70.9   2. Encounter for lipid screening for cardiovascular disease  Z13.220 V77.91    Z13.6 V81.2   3. Need for vaccination  Z23 V05.9   4. Routine screening for STI (sexually transmitted infection)  Z11.3 V74.5   5. Encounter for screening for HIV  Z11.4 V73.89   6. Rash  R21 782.1   7. Viral gastroenteritis  A08.4 008.8   8. Attention or concentration deficit  R41.840 799.51   9. Mood disorder  F39 296.90         Plan:     1. General medical exam  -     Comprehensive Metabolic Panel; Future; Expected date: 09/19/2023  -     CBC Auto Differential; Future; Expected date: 09/19/2023  -     Lipid Panel; Future; Expected date: 09/19/2023  Age  appropriate screenings, vaccinations, and recommendations reviewed and discussed.  Appropriate orders placed and previous results reviewed.    2. Encounter for lipid screening for cardiovascular disease  -     Lipid Panel; Future; Expected date: 09/19/2023  Screening lipid panel ordered as per USPSTF guidelines.    3. Need for vaccination  -     hepatitis B vacc-CpG 1018, PF, (HEPLISAV-B, PF,) 20 mcg/0.5 mL Syrg; 0.5 ml IM x 2 doses at 0 and 1-2 months  Dispense: 0.5 mL; Refill: 1  Patient needs Heplisav. Dose administered today.    4. Routine screening for STI (sexually transmitted infection)  -     C. trachomatis/N. gonorrhoeae by AMP DNA; Future; Expected date: 09/19/2023  -     RPR; Future; Expected date: 09/19/2023  -     HIV 1/2 Ag/Ab (4th Gen); Future; Expected date: 09/19/2023    5. Encounter for screening for HIV  -     HIV 1/2 Ag/Ab (4th Gen); Future; Expected date: 09/19/2023  Screen for HIV as per USPSTF guidelines.    6. Rash  -     naftifine 2 % Crea; Apply to affected rash area QD X 4 weeks  Dispense: 60 g; Refill: 0    7. Viral gastroenteritis  -     diphenoxylate-atropine 2.5-0.025 mg (LOMOTIL) 2.5-0.025 mg per tablet; Take 2 tablets by mouth 4 (four) times daily as needed for Diarrhea.  Dispense: 30 tablet; Refill: 0  Discussed symptomatic care.  Prescribed Lomotil for symptoms.  Discussed bland diet until symptoms improve.    8. Attention or concentration deficit/  9. Mood disorder  -     buPROPion (WELLBUTRIN XL) 300 MG 24 hr tablet; Take 1 tablet (300 mg total) by mouth once daily.  Dispense: 90 tablet; Refill: 3  Continue Wellbutrin 300 milligrams daily.         -Paul Matute Jr., MD, AAHIVS      This office note has been dictated.  This dictation has been generated using M-Modal Fluency Direct dictation; some phonetic errors may occur.         There are no Patient Instructions on file for this visit.      Future Appointments   Date Time Provider Department Center   10/3/2023  8:15 AM LAB,  [facility]   10/3/2023  8:30 AM LAB, [facility]

## 2023-10-02 ENCOUNTER — PATIENT MESSAGE (OUTPATIENT)
Dept: FAMILY MEDICINE | Facility: CLINIC | Age: 28
End: 2023-10-02
Payer: COMMERCIAL

## 2023-10-03 ENCOUNTER — LAB VISIT (OUTPATIENT)
Dept: LAB | Facility: HOSPITAL | Age: 28
End: 2023-10-03
Attending: FAMILY MEDICINE
Payer: COMMERCIAL

## 2023-10-03 DIAGNOSIS — Z13.220 ENCOUNTER FOR LIPID SCREENING FOR CARDIOVASCULAR DISEASE: ICD-10-CM

## 2023-10-03 DIAGNOSIS — Z00.00 GENERAL MEDICAL EXAM: ICD-10-CM

## 2023-10-03 DIAGNOSIS — Z13.6 ENCOUNTER FOR LIPID SCREENING FOR CARDIOVASCULAR DISEASE: ICD-10-CM

## 2023-10-03 DIAGNOSIS — Z11.4 ENCOUNTER FOR SCREENING FOR HIV: ICD-10-CM

## 2023-10-03 DIAGNOSIS — Z11.3 ROUTINE SCREENING FOR STI (SEXUALLY TRANSMITTED INFECTION): ICD-10-CM

## 2023-10-03 LAB
ALBUMIN SERPL BCP-MCNC: 4.2 G/DL (ref 3.5–5.2)
ALP SERPL-CCNC: 72 U/L (ref 55–135)
ALT SERPL W/O P-5'-P-CCNC: 25 U/L (ref 10–44)
ANION GAP SERPL CALC-SCNC: 5 MMOL/L (ref 8–16)
AST SERPL-CCNC: 31 U/L (ref 10–40)
BASOPHILS # BLD AUTO: 0.05 K/UL (ref 0–0.2)
BASOPHILS NFR BLD: 1.1 % (ref 0–1.9)
BILIRUB SERPL-MCNC: 0.9 MG/DL (ref 0.1–1)
BUN SERPL-MCNC: 10 MG/DL (ref 6–20)
CALCIUM SERPL-MCNC: 9.2 MG/DL (ref 8.7–10.5)
CHLORIDE SERPL-SCNC: 110 MMOL/L (ref 95–110)
CHOLEST SERPL-MCNC: 111 MG/DL (ref 120–199)
CHOLEST/HDLC SERPL: 2.1 {RATIO} (ref 2–5)
CO2 SERPL-SCNC: 26 MMOL/L (ref 23–29)
CREAT SERPL-MCNC: 0.9 MG/DL (ref 0.5–1.4)
DIFFERENTIAL METHOD: NORMAL
EOSINOPHIL # BLD AUTO: 0.3 K/UL (ref 0–0.5)
EOSINOPHIL NFR BLD: 5.9 % (ref 0–8)
ERYTHROCYTE [DISTWIDTH] IN BLOOD BY AUTOMATED COUNT: 11.9 % (ref 11.5–14.5)
EST. GFR  (NO RACE VARIABLE): >60 ML/MIN/1.73 M^2
GLUCOSE SERPL-MCNC: 81 MG/DL (ref 70–110)
HCT VFR BLD AUTO: 44.9 % (ref 40–54)
HDLC SERPL-MCNC: 52 MG/DL (ref 40–75)
HDLC SERPL: 46.8 % (ref 20–50)
HGB BLD-MCNC: 14.9 G/DL (ref 14–18)
HIV 1+2 AB+HIV1 P24 AG SERPL QL IA: NORMAL
IMM GRANULOCYTES # BLD AUTO: 0.01 K/UL (ref 0–0.04)
IMM GRANULOCYTES NFR BLD AUTO: 0.2 % (ref 0–0.5)
LDLC SERPL CALC-MCNC: 48.2 MG/DL (ref 63–159)
LYMPHOCYTES # BLD AUTO: 1.9 K/UL (ref 1–4.8)
LYMPHOCYTES NFR BLD: 39.5 % (ref 18–48)
MCH RBC QN AUTO: 29.3 PG (ref 27–31)
MCHC RBC AUTO-ENTMCNC: 33.2 G/DL (ref 32–36)
MCV RBC AUTO: 88 FL (ref 82–98)
MONOCYTES # BLD AUTO: 0.4 K/UL (ref 0.3–1)
MONOCYTES NFR BLD: 8 % (ref 4–15)
NEUTROPHILS # BLD AUTO: 2.2 K/UL (ref 1.8–7.7)
NEUTROPHILS NFR BLD: 45.3 % (ref 38–73)
NONHDLC SERPL-MCNC: 59 MG/DL
NRBC BLD-RTO: 0 /100 WBC
PLATELET # BLD AUTO: 226 K/UL (ref 150–450)
PMV BLD AUTO: 10.5 FL (ref 9.2–12.9)
POTASSIUM SERPL-SCNC: 4 MMOL/L (ref 3.5–5.1)
PROT SERPL-MCNC: 7 G/DL (ref 6–8.4)
RBC # BLD AUTO: 5.09 M/UL (ref 4.6–6.2)
SODIUM SERPL-SCNC: 141 MMOL/L (ref 136–145)
TRIGL SERPL-MCNC: 54 MG/DL (ref 30–150)
WBC # BLD AUTO: 4.76 K/UL (ref 3.9–12.7)

## 2023-10-03 PROCEDURE — 36415 COLL VENOUS BLD VENIPUNCTURE: CPT | Mod: PO | Performed by: FAMILY MEDICINE

## 2023-10-03 PROCEDURE — 80053 COMPREHEN METABOLIC PANEL: CPT | Performed by: FAMILY MEDICINE

## 2023-10-03 PROCEDURE — 86592 SYPHILIS TEST NON-TREP QUAL: CPT | Performed by: FAMILY MEDICINE

## 2023-10-03 PROCEDURE — 87389 HIV-1 AG W/HIV-1&-2 AB AG IA: CPT | Performed by: FAMILY MEDICINE

## 2023-10-03 PROCEDURE — 80061 LIPID PANEL: CPT | Performed by: FAMILY MEDICINE

## 2023-10-03 PROCEDURE — 85025 COMPLETE CBC W/AUTO DIFF WBC: CPT | Performed by: FAMILY MEDICINE

## 2023-10-03 RX ORDER — PRENATAL VIT 91/IRON/FOLIC/DHA 28-975-200
COMBINATION PACKAGE (EA) ORAL 2 TIMES DAILY
Qty: 30 G | Refills: 0 | Status: SHIPPED | OUTPATIENT
Start: 2023-10-03 | End: 2023-10-31

## 2023-10-04 LAB — RPR SER QL: NORMAL

## 2023-11-01 ENCOUNTER — TELEPHONE (OUTPATIENT)
Dept: FAMILY MEDICINE | Facility: CLINIC | Age: 28
End: 2023-11-01
Payer: COMMERCIAL

## 2023-11-02 ENCOUNTER — NURSE TRIAGE (OUTPATIENT)
Dept: ADMINISTRATIVE | Facility: CLINIC | Age: 28
End: 2023-11-02
Payer: COMMERCIAL

## 2023-11-02 NOTE — TELEPHONE ENCOUNTER
Thinks he has inguinal hernia x 1 year. Over past week started growing in size and pain is worsening. Right sided. States was able to reduce hernia himself last night but is protruding again. Area has constant pain and is tender to touch. Care advice per protocol. Advised ED now. States he will go to ED for eval. Advised to call back with concerns or worsening symptoms. Verbalized understanding.    Reason for Disposition   Hernia is painful or tender to touch    Additional Information   Negative: SEVERE abdominal pain    Protocols used: Hernia-A-[place]

## 2023-12-04 ENCOUNTER — PATIENT MESSAGE (OUTPATIENT)
Dept: FAMILY MEDICINE | Facility: CLINIC | Age: 28
End: 2023-12-04
Payer: COMMERCIAL

## 2024-08-09 ENCOUNTER — LAB VISIT (OUTPATIENT)
Dept: LAB | Facility: HOSPITAL | Age: 29
End: 2024-08-09
Attending: FAMILY MEDICINE
Payer: COMMERCIAL

## 2024-08-09 ENCOUNTER — OFFICE VISIT (OUTPATIENT)
Dept: FAMILY MEDICINE | Facility: CLINIC | Age: 29
End: 2024-08-09
Payer: COMMERCIAL

## 2024-08-09 VITALS
HEART RATE: 67 BPM | WEIGHT: 158.94 LBS | OXYGEN SATURATION: 98 % | BODY MASS INDEX: 22.25 KG/M2 | SYSTOLIC BLOOD PRESSURE: 126 MMHG | DIASTOLIC BLOOD PRESSURE: 88 MMHG | RESPIRATION RATE: 18 BRPM | HEIGHT: 71 IN

## 2024-08-09 DIAGNOSIS — Z00.00 GENERAL MEDICAL EXAM: Primary | ICD-10-CM

## 2024-08-09 DIAGNOSIS — Z13.220 ENCOUNTER FOR LIPID SCREENING FOR CARDIOVASCULAR DISEASE: ICD-10-CM

## 2024-08-09 DIAGNOSIS — Z13.6 ENCOUNTER FOR LIPID SCREENING FOR CARDIOVASCULAR DISEASE: ICD-10-CM

## 2024-08-09 DIAGNOSIS — R41.840 ATTENTION OR CONCENTRATION DEFICIT: ICD-10-CM

## 2024-08-09 DIAGNOSIS — T81.30XA ABDOMINAL WOUND DEHISCENCE, INITIAL ENCOUNTER: ICD-10-CM

## 2024-08-09 DIAGNOSIS — Z00.00 GENERAL MEDICAL EXAM: ICD-10-CM

## 2024-08-09 DIAGNOSIS — Z23 NEED FOR VACCINATION: ICD-10-CM

## 2024-08-09 DIAGNOSIS — F39 MOOD DISORDER: ICD-10-CM

## 2024-08-09 LAB
ALBUMIN SERPL BCP-MCNC: 4.2 G/DL (ref 3.5–5.2)
ALP SERPL-CCNC: 78 U/L (ref 55–135)
ALT SERPL W/O P-5'-P-CCNC: 17 U/L (ref 10–44)
ANION GAP SERPL CALC-SCNC: 8 MMOL/L (ref 8–16)
AST SERPL-CCNC: 22 U/L (ref 10–40)
BASOPHILS # BLD AUTO: 0.05 K/UL (ref 0–0.2)
BASOPHILS NFR BLD: 1 % (ref 0–1.9)
BILIRUB SERPL-MCNC: 1 MG/DL (ref 0.1–1)
BUN SERPL-MCNC: 11 MG/DL (ref 6–20)
CALCIUM SERPL-MCNC: 9.5 MG/DL (ref 8.7–10.5)
CHLORIDE SERPL-SCNC: 108 MMOL/L (ref 95–110)
CHOLEST SERPL-MCNC: 124 MG/DL (ref 120–199)
CHOLEST/HDLC SERPL: 2.4 {RATIO} (ref 2–5)
CO2 SERPL-SCNC: 25 MMOL/L (ref 23–29)
CREAT SERPL-MCNC: 0.9 MG/DL (ref 0.5–1.4)
DIFFERENTIAL METHOD BLD: NORMAL
EOSINOPHIL # BLD AUTO: 0.1 K/UL (ref 0–0.5)
EOSINOPHIL NFR BLD: 2.2 % (ref 0–8)
ERYTHROCYTE [DISTWIDTH] IN BLOOD BY AUTOMATED COUNT: 11.6 % (ref 11.5–14.5)
EST. GFR  (NO RACE VARIABLE): >60 ML/MIN/1.73 M^2
GLUCOSE SERPL-MCNC: 84 MG/DL (ref 70–110)
HCT VFR BLD AUTO: 43.4 % (ref 40–54)
HDLC SERPL-MCNC: 51 MG/DL (ref 40–75)
HDLC SERPL: 41.1 % (ref 20–50)
HGB BLD-MCNC: 14.5 G/DL (ref 14–18)
IMM GRANULOCYTES # BLD AUTO: 0.02 K/UL (ref 0–0.04)
IMM GRANULOCYTES NFR BLD AUTO: 0.4 % (ref 0–0.5)
LDLC SERPL CALC-MCNC: 64.8 MG/DL (ref 63–159)
LYMPHOCYTES # BLD AUTO: 1.5 K/UL (ref 1–4.8)
LYMPHOCYTES NFR BLD: 30.1 % (ref 18–48)
MCH RBC QN AUTO: 29.1 PG (ref 27–31)
MCHC RBC AUTO-ENTMCNC: 33.4 G/DL (ref 32–36)
MCV RBC AUTO: 87 FL (ref 82–98)
MONOCYTES # BLD AUTO: 0.5 K/UL (ref 0.3–1)
MONOCYTES NFR BLD: 8.9 % (ref 4–15)
NEUTROPHILS # BLD AUTO: 2.9 K/UL (ref 1.8–7.7)
NEUTROPHILS NFR BLD: 57.4 % (ref 38–73)
NONHDLC SERPL-MCNC: 73 MG/DL
NRBC BLD-RTO: 0 /100 WBC
PLATELET # BLD AUTO: 208 K/UL (ref 150–450)
PMV BLD AUTO: 10.2 FL (ref 9.2–12.9)
POTASSIUM SERPL-SCNC: 4 MMOL/L (ref 3.5–5.1)
PROT SERPL-MCNC: 7.3 G/DL (ref 6–8.4)
RBC # BLD AUTO: 4.98 M/UL (ref 4.6–6.2)
SODIUM SERPL-SCNC: 141 MMOL/L (ref 136–145)
TRIGL SERPL-MCNC: 41 MG/DL (ref 30–150)
WBC # BLD AUTO: 5.08 K/UL (ref 3.9–12.7)

## 2024-08-09 PROCEDURE — 36415 COLL VENOUS BLD VENIPUNCTURE: CPT | Mod: PN | Performed by: FAMILY MEDICINE

## 2024-08-09 PROCEDURE — 90739 HEPB VACC 2/4 DOSE ADULT IM: CPT | Mod: S$GLB,,, | Performed by: FAMILY MEDICINE

## 2024-08-09 PROCEDURE — 99395 PREV VISIT EST AGE 18-39: CPT | Mod: 25,S$GLB,, | Performed by: FAMILY MEDICINE

## 2024-08-09 PROCEDURE — 3079F DIAST BP 80-89 MM HG: CPT | Mod: CPTII,S$GLB,, | Performed by: FAMILY MEDICINE

## 2024-08-09 PROCEDURE — 3074F SYST BP LT 130 MM HG: CPT | Mod: CPTII,S$GLB,, | Performed by: FAMILY MEDICINE

## 2024-08-09 PROCEDURE — 1159F MED LIST DOCD IN RCRD: CPT | Mod: CPTII,S$GLB,, | Performed by: FAMILY MEDICINE

## 2024-08-09 PROCEDURE — 80053 COMPREHEN METABOLIC PANEL: CPT | Performed by: FAMILY MEDICINE

## 2024-08-09 PROCEDURE — 90471 IMMUNIZATION ADMIN: CPT | Mod: S$GLB,,, | Performed by: FAMILY MEDICINE

## 2024-08-09 PROCEDURE — 3008F BODY MASS INDEX DOCD: CPT | Mod: CPTII,S$GLB,, | Performed by: FAMILY MEDICINE

## 2024-08-09 PROCEDURE — 80061 LIPID PANEL: CPT | Performed by: FAMILY MEDICINE

## 2024-08-09 PROCEDURE — 99999 PR PBB SHADOW E&M-EST. PATIENT-LVL IV: CPT | Mod: PBBFAC,,, | Performed by: FAMILY MEDICINE

## 2024-08-09 PROCEDURE — 85025 COMPLETE CBC W/AUTO DIFF WBC: CPT | Performed by: FAMILY MEDICINE

## 2024-08-09 PROCEDURE — 1160F RVW MEDS BY RX/DR IN RCRD: CPT | Mod: CPTII,S$GLB,, | Performed by: FAMILY MEDICINE

## 2024-08-09 RX ORDER — BUPROPION HYDROCHLORIDE 300 MG/1
300 TABLET ORAL DAILY
Qty: 90 TABLET | Refills: 3 | Status: SHIPPED | OUTPATIENT
Start: 2024-08-09

## 2024-08-09 RX ORDER — BUPROPION HYDROCHLORIDE 150 MG/1
150 TABLET ORAL DAILY
Qty: 4 TABLET | Refills: 0 | Status: SHIPPED | OUTPATIENT
Start: 2024-08-09 | End: 2024-08-13

## 2024-08-12 NOTE — PROGRESS NOTES
Patient Name: Arnel Edmond    : 1995  MRN: [id number]      Subjective:     Patient ID: Arnel is a 28 y.o. male    Chief Complaint:  Health Maintenance    History of Present Illness  The patient presents for an annual checkup and to potentially restart his bupropion prescription.    He reports that his bupropion prescription  in 2023. While on bupropion, he experienced no significant side effects, and it improved his mood and concentration. He increased his activity level and was running a lot, even training for a marathon. However, due to surgery and a knee issue, his running ceased.     He underwent a right inguinal hernia repair on 2023 at [facility] in [place].  He reports that one of the incisions from the surgery is starting to herniate. It is most prominent when he is full or standing. He initially thought it had keloided, but recently he was able to palpate it after a meal. He notices it most when he has just eaten a large meal. He reports no discharge from it, and the skin has remained intact.    In 2024, he sustained a knee injury that required 20 stitches after falling on his basement stairs. His knee has mostly recovered, but he experiences pain and soreness following physical activity. Fortunately, there was no tendon or bone damage.    He continues to use an albuterol inhaler and nebulizer as needed.    He did not receive the hepatitis B vaccine previously recommended.        Review of Systems   Constitutional:  Negative for activity change and unexpected weight change.   HENT:  Negative for hearing loss, rhinorrhea and trouble swallowing.    Eyes:  Negative for discharge and visual disturbance.   Respiratory:  Negative for chest tightness and wheezing.    Cardiovascular:  Negative for chest pain and palpitations.   Gastrointestinal:  Positive for diarrhea. Negative for blood in stool, constipation and vomiting.   Endocrine: Negative for  "polydipsia and polyuria.   Genitourinary:  Negative for difficulty urinating, hematuria and urgency.   Musculoskeletal:  Negative for arthralgias, joint swelling and neck pain.   Neurological:  Negative for weakness and headaches.   Psychiatric/Behavioral:  Positive for dysphoric mood. Negative for confusion.         Objective:   /88 (BP Location: Left arm, Patient Position: Sitting, BP Method: Medium (Manual))   Pulse 67   Resp 18   Ht 5' 11" (1.803 m)   Wt 72.1 kg (158 lb 15.2 oz)   SpO2 98%   BMI 22.17 kg/m²     Physical Exam    Physical Exam  Vitals reviewed.   Constitutional:       General: He is not in acute distress.  HENT:      Head: Normocephalic and atraumatic.      Right Ear: Ear canal and external ear normal.      Left Ear: Ear canal and external ear normal.      Nose: Nose normal.      Mouth/Throat:      Mouth: Mucous membranes are moist.      Pharynx: No oropharyngeal exudate or posterior oropharyngeal erythema.   Eyes:      Extraocular Movements: Extraocular movements intact.      Conjunctiva/sclera: Conjunctivae normal.      Pupils: Pupils are equal, round, and reactive to light.   Cardiovascular:      Rate and Rhythm: Normal rate and regular rhythm.      Pulses: Normal pulses.      Heart sounds: Normal heart sounds.   Pulmonary:      Effort: Pulmonary effort is normal. No respiratory distress.      Breath sounds: No wheezing or rales.   Abdominal:      General: Abdomen is flat. Bowel sounds are normal. There is no distension.      Palpations: Abdomen is soft.      Tenderness: There is no abdominal tenderness. There is no guarding.   Musculoskeletal:      Cervical back: Normal range of motion. No rigidity or tenderness.   Lymphadenopathy:      Cervical: No cervical adenopathy.   Skin:     General: Skin is warm.      Capillary Refill: Capillary refill takes less than 2 seconds.   Neurological:      Mental Status: He is alert and oriented to person, place, and time.      Cranial Nerves: No " cranial nerve deficit.      Sensory: No sensory deficit.   Psychiatric:         Mood and Affect: Mood normal.         Behavior: Behavior normal.            Assessment        ICD-10-CM ICD-9-CM   1. General medical exam  Z00.00 V70.9   2. Encounter for lipid screening for cardiovascular disease  Z13.220 V77.91    Z13.6 V81.2   3. Need for vaccination  Z23 V05.9   4. Attention or concentration deficit  R41.840 799.51   5. Mood disorder  F39 296.90   6. Abdominal wound dehiscence, initial encounter  T81.30XA 998.30         Plan:     Assessment & Plan  1. Annual checkup.  Age appropriate screenings, vaccinations, and recommendations reviewed and discussed.  Appropriate orders placed and previous results reviewed.   Screening lipid panel ordered as per USPSTF guidelines.   First dose of Heplisav vaccination done today.  Will do next dose in 1 to 2 months.    2. Attention deficit/mood disorder  Bupropion 150 mg will be initiated for a period of 4 days, after which the dosage will be increased to 300 mg. A prescription for a 3-month supply of Bupropion 300 mg with one refill has been provided. The patient reports that bupropion previously helped with mood and concentration without significant side effects.    3. Abdominal wound dehiscence  The patient had a right inguinal hernia repair on November 2, 2020. He reports that one of the incisions from the surgery is starting to herniate. A referral to Dr. Acosta has been made for further evaluation. A CAT scan may be considered to confirm if the hernia has reopened.      Follow-up  A virtual follow-up visit is scheduled for 02/10/2025 at 3:20 PM.     ORDERS  1. General medical exam  -     Comprehensive Metabolic Panel; Future; Expected date: 08/09/2024  -     Lipid Panel; Future; Expected date: 08/09/2024  -     CBC Auto Differential; Future; Expected date: 08/09/2024    2. Encounter for lipid screening for cardiovascular disease  -     Lipid Panel; Future; Expected date:  08/09/2024    3. Need for vaccination  -     hepatitis B (HEPLISAV-B) 20 mcg/0.5 mL vaccine 0.5 mL    4. Attention or concentration deficit  -     buPROPion (WELLBUTRIN XL) 300 MG 24 hr tablet; Take 1 tablet (300 mg total) by mouth once daily.  Dispense: 90 tablet; Refill: 3  -     buPROPion (WELLBUTRIN XL) 150 MG TB24 tablet; Take 1 tablet (150 mg total) by mouth once daily. for 4 days  Dispense: 4 tablet; Refill: 0    5. Mood disorder  -     buPROPion (WELLBUTRIN XL) 300 MG 24 hr tablet; Take 1 tablet (300 mg total) by mouth once daily.  Dispense: 90 tablet; Refill: 3  -     buPROPion (WELLBUTRIN XL) 150 MG TB24 tablet; Take 1 tablet (150 mg total) by mouth once daily. for 4 days  Dispense: 4 tablet; Refill: 0    6. Abdominal wound dehiscence, initial encounter  -     Ambulatory referral/consult to General Surgery; Future; Expected date: 08/16/2024             -Paul Matute Jr., MD, AAHIVS      This note was generated with the assistance of ambient listening technology. Verbal consent was obtained by the patient and accompanying visitor(s) for the recording of patient appointment to facilitate this note. I attest to having reviewed and edited the generated note for accuracy, though some syntax or spelling errors may persist. Please contact the author of this note for any clarification.          There are no Patient Instructions on file for this visit.    Follow Up  No follow-ups on file.    Future Appointments   Date Time Provider Department Center   10/2/2024 10:00 AM NURSE, [facility]   2/10/2025  3:20 PM Paul Matute Jr., MD [facility]

## 2024-10-02 ENCOUNTER — CLINICAL SUPPORT (OUTPATIENT)
Dept: FAMILY MEDICINE | Facility: CLINIC | Age: 29
End: 2024-10-02
Payer: COMMERCIAL

## 2024-10-02 DIAGNOSIS — Z23 NEED FOR VACCINATION: Primary | ICD-10-CM

## 2024-10-02 PROCEDURE — 99999 PR PBB SHADOW E&M-EST. PATIENT-LVL I: CPT | Mod: PBBFAC,,,

## 2024-10-02 NOTE — PROGRESS NOTES
ADMIN MEDICATION WITHOUT COMPLICATION . ALL TEACHING GIVEN AND ALL QUESTIONS ANSWERED.VIS GIVEN.  PT DECIDING TO GET THE INFLUENZA VACCINE ALSO,  GIVEN AND PT INSTRUCTED TO WAIT IN WAITING ROOM 15 MIN

## 2024-12-05 ENCOUNTER — OFFICE VISIT (OUTPATIENT)
Dept: INTERNAL MEDICINE | Facility: CLINIC | Age: 29
End: 2024-12-05
Payer: COMMERCIAL

## 2024-12-05 DIAGNOSIS — H00.014 HORDEOLUM EXTERNUM OF LEFT UPPER EYELID: Primary | ICD-10-CM

## 2024-12-05 PROCEDURE — 99214 OFFICE O/P EST MOD 30 MIN: CPT | Mod: 95,,, | Performed by: PHYSICIAN ASSISTANT

## 2024-12-05 RX ORDER — ERYTHROMYCIN 5 MG/G
OINTMENT OPHTHALMIC 3 TIMES DAILY
Qty: 3.5 G | Refills: 0 | Status: SHIPPED | OUTPATIENT
Start: 2024-12-05 | End: 2024-12-10

## 2024-12-06 NOTE — PROGRESS NOTES
INTERNAL MEDICINE URGENT VISIT NOTE    CHIEF COMPLAINT     Chief Complaint   Patient presents with    eye lid problem       HPI     Arnel Edmond is a 29 y.o. male who presents for an urgent visit today.    PCP is Paul Matute Jr., MD, patient is new to me.     Patient complaints with red swollen left upper eyelid that has been present for approximately 2 days prior to arrival.  He has been applying warm compresses with no significant relief.  Does wear contact lenses but has taken them out when symptoms started.  Denies any vision changes, eye irritation redness or tearing.  No history of stye or chalazion.  Denies fever, chills, nausea or vomiting.  No vision changes.  Will prescribe topical erythromycin in the event that I becomes more inflamed.  Patient reassured that warm compresses are helpful and that he should continue them.  Ophthalmology referral placed.     Patient in [place] during this virtual visit  Face time with patient approximately 6 minutes    Past Medical History:  Past Medical History:   Diagnosis Date    Asthma        Home Medications:  Prior to Admission medications    Medication Sig Start Date End Date Taking? Authorizing Provider   albuterol (PROAIR HFA) 90 mcg/actuation inhaler Inhale 2 puffs into the lungs every 6 (six) hours as needed for Wheezing. Rescue 6/15/23   Clementina Khalil MD   buPROPion (WELLBUTRIN XL) 300 MG 24 hr tablet Take 1 tablet (300 mg total) by mouth once daily. 8/9/24   Paul Matute Jr., MD   erythromycin (ROMYCIN) ophthalmic ointment Place into the left eye 3 (three) times daily. for 5 days 12/5/24 12/10/24  Deepthi Hernandez, PA-C       Review of Systems:  Review of Systems   Constitutional:  Negative for activity change and unexpected weight change.   HENT:  Negative for hearing loss, rhinorrhea and trouble swallowing.    Eyes:  Positive for redness. Negative for discharge and visual disturbance.   Respiratory:  Negative for chest tightness and  "wheezing.    Cardiovascular:  Negative for chest pain and palpitations.   Gastrointestinal:  Negative for blood in stool, constipation, diarrhea and vomiting.   Endocrine: Negative for polydipsia and polyuria.   Genitourinary:  Negative for difficulty urinating, hematuria and urgency.   Musculoskeletal:  Negative for arthralgias, joint swelling and neck pain.   Neurological:  Negative for weakness and headaches.   Psychiatric/Behavioral:  Negative for confusion and dysphoric mood.        Health Maintainence:   Immunizations:  Health Maintenance         Date Due Completion Date    TETANUS VACCINE 03/01/2034 3/1/2024    RSV Vaccine (Age 60+ and Pregnant patients) (1 - 1-dose 75+ series) 12/03/2068 ---             PHYSICAL EXAM     There were no vitals taken for this visit. VV    Physical Exam  Constitutional:       Comments: Healthy appearing male in NAD or apparent pain. He makes good eye contact, speaks in clear full sentences and ambulates with ease.     Has left upper eye lid edema and visible nodule to the upper lid - no centralized punctate lesion appreciated.   Normal EOMM  No tearing or discharge or crusting          LABS     No results found for: "LABA1C", "HGBA1C"  CMP  Sodium   Date Value Ref Range Status   08/09/2024 141 136 - 145 mmol/L Final     Potassium   Date Value Ref Range Status   08/09/2024 4.0 3.5 - 5.1 mmol/L Final     Chloride   Date Value Ref Range Status   08/09/2024 108 95 - 110 mmol/L Final     CO2   Date Value Ref Range Status   08/09/2024 25 23 - 29 mmol/L Final     Glucose   Date Value Ref Range Status   08/09/2024 84 70 - 110 mg/dL Final     BUN   Date Value Ref Range Status   08/09/2024 11 6 - 20 mg/dL Final     Creatinine   Date Value Ref Range Status   08/09/2024 0.9 0.5 - 1.4 mg/dL Final     Calcium   Date Value Ref Range Status   08/09/2024 9.5 8.7 - 10.5 mg/dL Final     Total Protein   Date Value Ref Range Status   08/09/2024 7.3 6.0 - 8.4 g/dL Final     Albumin   Date Value Ref " Range Status   08/09/2024 4.2 3.5 - 5.2 g/dL Final     Total Bilirubin   Date Value Ref Range Status   08/09/2024 1.0 0.1 - 1.0 mg/dL Final     Comment:     For infants and newborns, interpretation of results should be based  on gestational age, weight and in agreement with clinical  observations.    Premature Infant recommended reference ranges:  Up to 24 hours.............<8.0 mg/dL  Up to 48 hours............<12.0 mg/dL  3-5 days..................<15.0 mg/dL  6-29 days.................<15.0 mg/dL       Alkaline Phosphatase   Date Value Ref Range Status   08/09/2024 78 55 - 135 U/L Final     AST   Date Value Ref Range Status   08/09/2024 22 10 - 40 U/L Final     ALT   Date Value Ref Range Status   08/09/2024 17 10 - 44 U/L Final     Anion Gap   Date Value Ref Range Status   08/09/2024 8 8 - 16 mmol/L Final     eGFR if    Date Value Ref Range Status   07/12/2021 >60 >60 mL/min/1.73 m^2 Final     eGFR if non    Date Value Ref Range Status   07/12/2021 >60 >60 mL/min/1.73 m^2 Final     Comment:     Calculation used to obtain the estimated glomerular filtration  rate (eGFR) is the CKD-EPI equation.        Lab Results   Component Value Date    WBC 5.08 08/09/2024    HGB 14.5 08/09/2024    HCT 43.4 08/09/2024    MCV 87 08/09/2024     08/09/2024     Lab Results   Component Value Date    CHOL 124 08/09/2024    CHOL 111 (L) 10/03/2023    CHOL 113 (L) 08/12/2022     Lab Results   Component Value Date    HDL 51 08/09/2024    HDL 52 10/03/2023    HDL 53 08/12/2022     Lab Results   Component Value Date    LDLCALC 64.8 08/09/2024    LDLCALC 48.2 (L) 10/03/2023    LDLCALC 49.2 (L) 08/12/2022     Lab Results   Component Value Date    TRIG 41 08/09/2024    TRIG 54 10/03/2023    TRIG 54 08/12/2022     Lab Results   Component Value Date    CHOLHDL 41.1 08/09/2024    CHOLHDL 46.8 10/03/2023    CHOLHDL 46.9 08/12/2022     Lab Results   Component Value Date    TSH 2.681 08/12/2022        ASSESSMENT/PLAN     Arnel Edmond is a 29 y.o. male     Arnel was seen today for eye lid problem.    Diagnoses and all orders for this visit:    Hordeolum externum of left upper eyelid  -     Ambulatory referral/consult to Ophthalmology; Future    Other orders  -     erythromycin (ROMYCIN) ophthalmic ointment; Place into the left eye 3 (three) times daily. for 5 days    . Patient was counseled on when and how to seek emergent care.       Deepthi Hernandez PA-C   Department of Internal Medicine - [facility]   6:43 PM

## 2024-12-09 ENCOUNTER — TELEPHONE (OUTPATIENT)
Dept: FAMILY MEDICINE | Facility: CLINIC | Age: 29
End: 2024-12-09
Payer: COMMERCIAL

## 2025-01-08 ENCOUNTER — LAB VISIT (OUTPATIENT)
Dept: LAB | Facility: HOSPITAL | Age: 30
End: 2025-01-08
Attending: FAMILY MEDICINE
Payer: COMMERCIAL

## 2025-01-08 ENCOUNTER — OFFICE VISIT (OUTPATIENT)
Dept: FAMILY MEDICINE | Facility: CLINIC | Age: 30
End: 2025-01-08
Payer: COMMERCIAL

## 2025-01-08 VITALS
HEIGHT: 71 IN | RESPIRATION RATE: 18 BRPM | WEIGHT: 168.88 LBS | HEART RATE: 69 BPM | SYSTOLIC BLOOD PRESSURE: 120 MMHG | DIASTOLIC BLOOD PRESSURE: 76 MMHG | OXYGEN SATURATION: 99 % | BODY MASS INDEX: 23.64 KG/M2 | TEMPERATURE: 98 F

## 2025-01-08 DIAGNOSIS — Z79.899 ENCOUNTER FOR LONG-TERM (CURRENT) USE OF MEDICATIONS: ICD-10-CM

## 2025-01-08 DIAGNOSIS — Z20.2 CONTACT WITH AND (SUSPECTED) EXPOSURE TO INFECTIONS WITH A PREDOMINANTLY SEXUAL MODE OF TRANSMISSION: ICD-10-CM

## 2025-01-08 DIAGNOSIS — J45.30 MILD PERSISTENT ASTHMA WITHOUT COMPLICATION: Primary | Chronic | ICD-10-CM

## 2025-01-08 DIAGNOSIS — Z20.6 CONTACT WITH AND (SUSPECTED) EXPOSURE TO HUMAN IMMUNODEFICIENCY VIRUS (HIV): ICD-10-CM

## 2025-01-08 DIAGNOSIS — Z29.81 ENCOUNTER FOR HIV PRE-EXPOSURE PROPHYLAXIS: ICD-10-CM

## 2025-01-08 DIAGNOSIS — Z20.6 EXPOSURE DURING THE NEONATAL PERIOD TO HUMAN IMMUNODEFICIENCY VIRUS (HIV) FROM MOTHER: ICD-10-CM

## 2025-01-08 LAB
ALBUMIN SERPL BCP-MCNC: 4.6 G/DL (ref 3.5–5.2)
ALP SERPL-CCNC: 86 U/L (ref 40–150)
ALT SERPL W/O P-5'-P-CCNC: 19 U/L (ref 10–44)
ANION GAP SERPL CALC-SCNC: 12 MMOL/L (ref 8–16)
AST SERPL-CCNC: 23 U/L (ref 10–40)
BILIRUB SERPL-MCNC: 1.3 MG/DL (ref 0.1–1)
BUN SERPL-MCNC: 13 MG/DL (ref 6–20)
CALCIUM SERPL-MCNC: 9.5 MG/DL (ref 8.7–10.5)
CHLORIDE SERPL-SCNC: 103 MMOL/L (ref 95–110)
CO2 SERPL-SCNC: 24 MMOL/L (ref 23–29)
CREAT SERPL-MCNC: 1 MG/DL (ref 0.5–1.4)
EST. GFR  (NO RACE VARIABLE): >60 ML/MIN/1.73 M^2
GLUCOSE SERPL-MCNC: 67 MG/DL (ref 70–110)
HIV1+2 IGG SERPL QL IA.RAPID: NORMAL
POTASSIUM SERPL-SCNC: 4.3 MMOL/L (ref 3.5–5.1)
PROT SERPL-MCNC: 7.6 G/DL (ref 6–8.4)
SODIUM SERPL-SCNC: 139 MMOL/L (ref 136–145)

## 2025-01-08 PROCEDURE — 36415 COLL VENOUS BLD VENIPUNCTURE: CPT | Mod: PN | Performed by: FAMILY MEDICINE

## 2025-01-08 PROCEDURE — 1160F RVW MEDS BY RX/DR IN RCRD: CPT | Mod: CPTII,S$GLB,, | Performed by: FAMILY MEDICINE

## 2025-01-08 PROCEDURE — 86706 HEP B SURFACE ANTIBODY: CPT | Performed by: FAMILY MEDICINE

## 2025-01-08 PROCEDURE — 1159F MED LIST DOCD IN RCRD: CPT | Mod: CPTII,S$GLB,, | Performed by: FAMILY MEDICINE

## 2025-01-08 PROCEDURE — 87536 HIV-1 QUANT&REVRSE TRNSCRPJ: CPT | Performed by: FAMILY MEDICINE

## 2025-01-08 PROCEDURE — 87340 HEPATITIS B SURFACE AG IA: CPT | Performed by: FAMILY MEDICINE

## 2025-01-08 PROCEDURE — 86704 HEP B CORE ANTIBODY TOTAL: CPT | Performed by: FAMILY MEDICINE

## 2025-01-08 PROCEDURE — 3008F BODY MASS INDEX DOCD: CPT | Mod: CPTII,S$GLB,, | Performed by: FAMILY MEDICINE

## 2025-01-08 PROCEDURE — 87491 CHLMYD TRACH DNA AMP PROBE: CPT | Performed by: FAMILY MEDICINE

## 2025-01-08 PROCEDURE — 86803 HEPATITIS C AB TEST: CPT | Performed by: FAMILY MEDICINE

## 2025-01-08 PROCEDURE — 99999 PR PBB SHADOW E&M-EST. PATIENT-LVL IV: CPT | Mod: PBBFAC,,, | Performed by: FAMILY MEDICINE

## 2025-01-08 PROCEDURE — 86790 VIRUS ANTIBODY NOS: CPT | Performed by: FAMILY MEDICINE

## 2025-01-08 PROCEDURE — 99214 OFFICE O/P EST MOD 30 MIN: CPT | Mod: S$GLB,,, | Performed by: FAMILY MEDICINE

## 2025-01-08 PROCEDURE — 80053 COMPREHEN METABOLIC PANEL: CPT | Performed by: FAMILY MEDICINE

## 2025-01-08 PROCEDURE — 3078F DIAST BP <80 MM HG: CPT | Mod: CPTII,S$GLB,, | Performed by: FAMILY MEDICINE

## 2025-01-08 PROCEDURE — 3074F SYST BP LT 130 MM HG: CPT | Mod: CPTII,S$GLB,, | Performed by: FAMILY MEDICINE

## 2025-01-08 PROCEDURE — 86593 SYPHILIS TEST NON-TREP QUANT: CPT | Performed by: FAMILY MEDICINE

## 2025-01-08 PROCEDURE — 86703 HIV-1/HIV-2 1 RESULT ANTBDY: CPT | Performed by: FAMILY MEDICINE

## 2025-01-09 LAB
HIV1 RNA # SERPL NAA+PROBE: NOT DETECTED COPIES/ML
HIV1 RNA SERPL QL NAA+PROBE: NOT DETECTED

## 2025-01-10 LAB
HAV IGG SER QL IA: REACTIVE
HBV CORE AB SERPL QL IA: NORMAL
HBV SURFACE AG SERPL QL IA: NORMAL
HCV AB SERPL QL IA: NORMAL
TREPONEMA PALLIDUM IGG+IGM AB [PRESENCE] IN SERUM OR PLASMA BY IMMUNOASSAY: NONREACTIVE

## 2025-01-12 ENCOUNTER — PATIENT MESSAGE (OUTPATIENT)
Dept: FAMILY MEDICINE | Facility: CLINIC | Age: 30
End: 2025-01-12
Payer: COMMERCIAL

## 2025-01-12 PROBLEM — J45.30 MILD PERSISTENT ASTHMA WITHOUT COMPLICATION: Status: ACTIVE | Noted: 2025-01-12

## 2025-01-12 RX ORDER — EMTRICITABINE AND TENOFOVIR ALAFENAMIDE 200; 25 MG/1; MG/1
1 TABLET ORAL DAILY
Qty: 30 TABLET | Refills: 1 | Status: SHIPPED | OUTPATIENT
Start: 2025-01-12

## 2025-01-13 LAB
C TRACH RRNA SPEC QL NAA+PROBE: NEGATIVE
C TRACH RRNA SPEC QL NAA+PROBE: NEGATIVE
N GONORRHOEA RRNA SPEC QL NAA+PROBE: NEGATIVE
N GONORRHOEA RRNA SPEC QL NAA+PROBE: NEGATIVE
N.GONORROHEAE, AMP RNA SOURCE: NORMAL
N.GONORROHEAE, AMP RNA SOURCE: NORMAL
SPECIMEN SOURCE: NORMAL
SPECIMEN SOURCE: NORMAL

## 2025-01-13 NOTE — PROGRESS NOTES
"  Patient Name: Arnel Edmond    : 1995  MRN: [id number]      Subjective:     Patient ID: Arnel is a 29 y.o. male    Chief Complaint:  prep (Pt also wants to talk to you about the cough he has been having for some time.)    History of Present Illness    Arnel presents today to discuss PrEP and a lingering cough.    He presents with a lingering cough since early December following a respiratory infection, with symptoms similar to his historic asthma. Albuterol provides partial relief but does not completely resolve symptoms. He reports wheezing that has been improving over the past two weeks, describing the sensation as more constricted rather than tight. His last similar episode occurred in summer 2022.    He was diagnosed with asthma as a child.     He would like to start HIV pre-exposure prophylaxis (PrEP). He has no current partners but reports awareness of HIV positive status in a partner of a partner. He understands the requirement for regular testing every three months as part of the PrEP protocol.      ROS:  General: -fever, -chills, -fatigue, -weight gain, -weight loss  Eyes: -vision changes, -redness, -discharge  ENT: -ear pain, -nasal congestion, -sore throat  Cardiovascular: -chest pain, -palpitations, -lower extremity edema  Respiratory: +cough, -shortness of breath, +wheezing  Gastrointestinal: -abdominal pain, -nausea, -vomiting, -diarrhea, -constipation, -blood in stool  Genitourinary: -dysuria, -hematuria, -frequency  Musculoskeletal: -joint pain, -muscle pain  Skin: -rash, -lesion  Neurological: -headache, -dizziness, -numbness, -tingling  Psychiatric: -anxiety, -depression, -sleep difficulty         Objective:   /76 (BP Location: Left arm, Patient Position: Sitting)   Pulse 69   Temp 98.3 °F (36.8 °C) (Oral)   Resp 18   Ht 5' 11" (1.803 m)   Wt 76.6 kg (168 lb 14 oz)   SpO2 99%   BMI 23.55 kg/m²     Physical Exam  Vitals reviewed.   Constitutional:       General: He is not in " acute distress.  HENT:      Head: Normocephalic and atraumatic.      Right Ear: Ear canal and external ear normal.      Left Ear: Ear canal and external ear normal.      Nose: Nose normal.      Mouth/Throat:      Mouth: Mucous membranes are moist.      Pharynx: No oropharyngeal exudate or posterior oropharyngeal erythema.   Eyes:      Extraocular Movements: Extraocular movements intact.      Conjunctiva/sclera: Conjunctivae normal.      Pupils: Pupils are equal, round, and reactive to light.   Cardiovascular:      Rate and Rhythm: Normal rate and regular rhythm.      Pulses: Normal pulses.      Heart sounds: Normal heart sounds.   Pulmonary:      Effort: Pulmonary effort is normal. No respiratory distress.      Breath sounds: Wheezing present. No rales.   Abdominal:      General: Abdomen is flat. Bowel sounds are normal. There is no distension.      Palpations: Abdomen is soft.      Tenderness: There is no abdominal tenderness. There is no guarding.   Musculoskeletal:      Cervical back: Normal range of motion. No rigidity or tenderness.   Lymphadenopathy:      Cervical: No cervical adenopathy.   Skin:     General: Skin is warm.      Capillary Refill: Capillary refill takes less than 2 seconds.   Neurological:      Mental Status: He is alert and oriented to person, place, and time.      Cranial Nerves: No cranial nerve deficit.      Sensory: No sensory deficit.   Psychiatric:         Mood and Affect: Mood normal.         Behavior: Behavior normal.                 Assessment        ICD-10-CM ICD-9-CM   1. Mild persistent asthma without complication  J45.30 493.90   2. Contact with and (suspected) exposure to human immunodeficiency virus (hiv)  Z20.6 V01.79   3. Contact with and (suspected) exposure to infections with a predominantly sexual mode of transmission  Z20.2 V01.6   4. Encounter for HIV pre-exposure prophylaxis  Z29.81 V82.89   5. Encounter for long-term (current) use of medications  Z79.899 V58.69      "    Plan:   Assessment & Plan    IMPRESSION:  Evaluated patient's lingering cough from December respiratory infection, likely related to asthma history  Considered PrEP options for HIV prevention, discussing oral (Truvada and Descovy) and injectable (Apretude) formulations  Reviewed differences between Truvada and Descovy, including kidney and bone health considerations  Discussed upcoming injectable PrEP option (6-month dosing) as potential future alternative  Recommend triple-site STI testing for comprehensive screening      SEXUALLY TRANSMITTED INFECTIONS (STIS):  Informed about potential for asymptomatic oral and rectal STIs.  Explained proper technique for self-collecting throat and rectal swabs for STI testing.  Ordered triple-site STI testing (throat, rectal, genital).    ASTHMA:  Started AirSupra inhaler for asthma symptoms: 2 puffs every 4-6 hours as needed, not to exceed 12 puffs in 24 hours.  AirSupra to replace albuterol.    PRE-EXPOSURE PROPHYLAXIS (PREP):  Explained PrEP options, including daily oral medications (Truvada and Descovy) and injectable option (Apretude).  Patient prefers oral option.  Discussed differences between Truvada and Descovy, including potential side effects and approval status.  Educated on "2-1-1" PrEP dosing method, noting it is not recommended in the [place] due to reduced efficacy.  Pending negative HIV labs will send prescription for Descovy 1 tablet daily for PrEP.  If Descovy not covered will send in generic albuterol.  Ordered pre-PrEP labs including HIV, STI panel, and hepatitis screening.  Follow up in 1 month to repeat labs after starting PrEP.  Obtain labs every 3 months for PrEP monitoring.  Follow up every 6 months, with option for virtual appointments.          1. Mild persistent asthma without complication  -     X-Ray Chest PA And Lateral; Future; Expected date: 01/08/2025  -     Discontinue: albuterol-budesonide (AIRSUPRA) 90-80 mcg/actuation; Inhale 2 puffs into the " lungs every 4 to 6 hours as needed (shortness of breath/ Wheezing).  Dispense: 10.7 g; Refill: 11  -     albuterol-budesonide (AIRSUPRA) 90-80 mcg/actuation; Inhale 2 puffs into the lungs every 4 to 6 hours as needed (shortness of breath/ Wheezing).  Dispense: 10.7 g; Refill: 11    2. Contact with and (suspected) exposure to human immunodeficiency virus (hiv)  -     Comprehensive Metabolic Panel; Future; Expected date: 01/08/2025  -     C. trachomatis/N. gonorrhoeae by AMP DNA; Future; Expected date: 01/08/2025  -     C.trach/N.gonor AMP RNA; Future; Expected date: 01/08/2025  -     C.trach/N.gonor AMP RNA; Future; Expected date: 01/08/2025  -     Hepatitis A antibody, IgG; Future; Expected date: 01/08/2025  -     Hepatitis B Surface Antigen; Future; Expected date: 01/08/2025  -     Hepatitis B Core Antibody, Total; Future; Expected date: 01/08/2025  -     Hepatitis B Surface Antibody, Qual/Quant; Future; Expected date: 01/08/2025  -     Hepatitis C Antibody; Future; Expected date: 01/08/2025  -     Treponema Pallidium Antibodies IgG, IgM; Future; Expected date: 01/08/2025  -     HIV RNA, Quantitative, PCR; Future; Expected date: 01/08/2025  -     Rapid HIV; Future; Expected date: 01/08/2025  -     Comprehensive Metabolic Panel; Standing  -     C. trachomatis/N. gonorrhoeae by AMP DNA; Standing  -     C.trach/N.gonor AMP RNA; Standing  -     C.trach/N.gonor AMP RNA; Standing  -     Hepatitis C Antibody; Standing  -     Treponema Pallidium Antibodies IgG, IgM; Standing  -     Rapid HIV; Standing  -     emtricitabine-tenofovir alafen (DESCOVY) 200-25 mg Tab; Take 1 tablet by mouth once daily.  Dispense: 30 tablet; Refill: 1    3. Contact with and (suspected) exposure to infections with a predominantly sexual mode of transmission  -     Comprehensive Metabolic Panel; Future; Expected date: 01/08/2025  -     C. trachomatis/N. gonorrhoeae by AMP DNA; Future; Expected date: 01/08/2025  -     C.trach/N.gonor AMP RNA;  Future; Expected date: 01/08/2025  -     C.trach/N.gonor AMP RNA; Future; Expected date: 01/08/2025  -     Hepatitis A antibody, IgG; Future; Expected date: 01/08/2025  -     Hepatitis B Surface Antigen; Future; Expected date: 01/08/2025  -     Hepatitis B Core Antibody, Total; Future; Expected date: 01/08/2025  -     Hepatitis B Surface Antibody, Qual/Quant; Future; Expected date: 01/08/2025  -     Hepatitis C Antibody; Future; Expected date: 01/08/2025  -     Treponema Pallidium Antibodies IgG, IgM; Future; Expected date: 01/08/2025  -     HIV RNA, Quantitative, PCR; Future; Expected date: 01/08/2025  -     Rapid HIV; Future; Expected date: 01/08/2025  -     Comprehensive Metabolic Panel; Standing  -     C. trachomatis/N. gonorrhoeae by AMP DNA; Standing  -     C.trach/N.gonor AMP RNA; Standing  -     C.trach/N.gonor AMP RNA; Standing  -     Hepatitis C Antibody; Standing  -     Treponema Pallidium Antibodies IgG, IgM; Standing  -     Rapid HIV; Standing    4. Encounter for HIV pre-exposure prophylaxis  -     Comprehensive Metabolic Panel; Future; Expected date: 01/08/2025  -     C. trachomatis/N. gonorrhoeae by AMP DNA; Future; Expected date: 01/08/2025  -     C.trach/N.gonor AMP RNA; Future; Expected date: 01/08/2025  -     C.trach/N.gonor AMP RNA; Future; Expected date: 01/08/2025  -     Hepatitis A antibody, IgG; Future; Expected date: 01/08/2025  -     Hepatitis B Surface Antigen; Future; Expected date: 01/08/2025  -     Hepatitis B Core Antibody, Total; Future; Expected date: 01/08/2025  -     Hepatitis B Surface Antibody, Qual/Quant; Future; Expected date: 01/08/2025  -     Hepatitis C Antibody; Future; Expected date: 01/08/2025  -     Treponema Pallidium Antibodies IgG, IgM; Future; Expected date: 01/08/2025  -     HIV RNA, Quantitative, PCR; Future; Expected date: 01/08/2025  -     Rapid HIV; Future; Expected date: 01/08/2025  -     Comprehensive Metabolic Panel; Standing  -     C. trachomatis/N.  gonorrhoeae by AMP DNA; Standing  -     C.trach/N.gonor AMP RNA; Standing  -     C.trach/N.gonor AMP RNA; Standing  -     Hepatitis C Antibody; Standing  -     Treponema Pallidium Antibodies IgG, IgM; Standing  -     Rapid HIV; Standing  -     emtricitabine-tenofovir alafen (DESCOVY) 200-25 mg Tab; Take 1 tablet by mouth once daily.  Dispense: 30 tablet; Refill: 1    5. Encounter for long-term (current) use of medications  -     Comprehensive Metabolic Panel; Future; Expected date: 01/08/2025  -     C. trachomatis/N. gonorrhoeae by AMP DNA; Future; Expected date: 01/08/2025  -     C.trach/N.gonor AMP RNA; Future; Expected date: 01/08/2025  -     C.trach/N.gonor AMP RNA; Future; Expected date: 01/08/2025  -     Hepatitis A antibody, IgG; Future; Expected date: 01/08/2025  -     Hepatitis B Surface Antigen; Future; Expected date: 01/08/2025  -     Hepatitis B Core Antibody, Total; Future; Expected date: 01/08/2025  -     Hepatitis B Surface Antibody, Qual/Quant; Future; Expected date: 01/08/2025  -     Hepatitis C Antibody; Future; Expected date: 01/08/2025  -     Treponema Pallidium Antibodies IgG, IgM; Future; Expected date: 01/08/2025  -     HIV RNA, Quantitative, PCR; Future; Expected date: 01/08/2025  -     Rapid HIV; Future; Expected date: 01/08/2025  -     Comprehensive Metabolic Panel; Standing  -     C. trachomatis/N. gonorrhoeae by AMP DNA; Standing  -     C.trach/N.gonor AMP RNA; Standing  -     C.trach/N.gonor AMP RNA; Standing  -     Hepatitis C Antibody; Standing  -     Treponema Pallidium Antibodies IgG, IgM; Standing  -     Rapid HIV; Standing             -Paul Matute Jr., MD, AAHIVS      This note was generated with the assistance of ambient listening technology. Verbal consent was obtained by the patient and accompanying visitor(s) for the recording of patient appointment to facilitate this note. I attest to having reviewed and edited the generated note for accuracy, though some syntax or  spelling errors may persist. Please contact the author of this note for any clarification.      There are no Patient Instructions on file for this visit.      No follow-ups on file.   Future Appointments   Date Time Provider Department Center   2/10/2025  8:20 AM LAB, [facility]   2/10/2025  8:40 AM LAB, [facility]   2/10/2025  3:20 PM Paul Matute Jr., MD [facility]   5/8/2025  8:15 AM LAB, [facility]   5/8/2025  8:30 AM LAB, [facility]   5/15/2025  1:00 PM Paul Matute Jr., MD [facility]

## 2025-01-15 LAB
HBV SURFACE AB SER QL IA: POSITIVE
HBV SURFACE AB SERPL IA-ACNC: NORMAL MIU/ML

## 2025-02-10 ENCOUNTER — OFFICE VISIT (OUTPATIENT)
Dept: FAMILY MEDICINE | Facility: CLINIC | Age: 30
End: 2025-02-10
Payer: COMMERCIAL

## 2025-02-10 ENCOUNTER — LAB VISIT (OUTPATIENT)
Dept: LAB | Facility: OTHER | Age: 30
End: 2025-02-10
Attending: FAMILY MEDICINE
Payer: COMMERCIAL

## 2025-02-10 DIAGNOSIS — Z20.6 CONTACT WITH AND (SUSPECTED) EXPOSURE TO HUMAN IMMUNODEFICIENCY VIRUS (HIV): ICD-10-CM

## 2025-02-10 DIAGNOSIS — Z29.81 ENCOUNTER FOR HIV PRE-EXPOSURE PROPHYLAXIS: ICD-10-CM

## 2025-02-10 DIAGNOSIS — Z20.2 CONTACT WITH AND (SUSPECTED) EXPOSURE TO INFECTIONS WITH A PREDOMINANTLY SEXUAL MODE OF TRANSMISSION: ICD-10-CM

## 2025-02-10 DIAGNOSIS — Z29.81 ENCOUNTER FOR HIV PRE-EXPOSURE PROPHYLAXIS: Primary | ICD-10-CM

## 2025-02-10 DIAGNOSIS — Z79.899 DRUG THERAPY: ICD-10-CM

## 2025-02-10 DIAGNOSIS — Z11.3 SCREEN FOR SEXUALLY TRANSMITTED DISEASES: ICD-10-CM

## 2025-02-10 DIAGNOSIS — Z79.899 ENCOUNTER FOR LONG-TERM (CURRENT) USE OF MEDICATIONS: ICD-10-CM

## 2025-02-10 DIAGNOSIS — Z11.4 SCREENING FOR HUMAN IMMUNODEFICIENCY VIRUS: ICD-10-CM

## 2025-02-10 LAB
ALBUMIN SERPL BCP-MCNC: 4 G/DL (ref 3.5–5.2)
ALP SERPL-CCNC: 80 U/L (ref 40–150)
ALT SERPL W/O P-5'-P-CCNC: 18 U/L (ref 10–44)
ANION GAP SERPL CALC-SCNC: 8 MMOL/L (ref 8–16)
AST SERPL-CCNC: 22 U/L (ref 10–40)
BILIRUB SERPL-MCNC: 0.6 MG/DL (ref 0.1–1)
BUN SERPL-MCNC: 13 MG/DL (ref 6–20)
CALCIUM SERPL-MCNC: 9.2 MG/DL (ref 8.7–10.5)
CHLORIDE SERPL-SCNC: 108 MMOL/L (ref 95–110)
CO2 SERPL-SCNC: 24 MMOL/L (ref 23–29)
CREAT SERPL-MCNC: 0.9 MG/DL (ref 0.5–1.4)
EST. GFR  (NO RACE VARIABLE): >60 ML/MIN/1.73 M^2
GLUCOSE SERPL-MCNC: 94 MG/DL (ref 70–110)
HCV AB SERPL QL IA: NEGATIVE
HIV1+2 IGG SERPL QL IA.RAPID: NORMAL
POTASSIUM SERPL-SCNC: 4.1 MMOL/L (ref 3.5–5.1)
PROT SERPL-MCNC: 7.1 G/DL (ref 6–8.4)
SODIUM SERPL-SCNC: 140 MMOL/L (ref 136–145)
TREPONEMA PALLIDUM IGG+IGM AB [PRESENCE] IN SERUM OR PLASMA BY IMMUNOASSAY: NONREACTIVE

## 2025-02-10 PROCEDURE — 86703 HIV-1/HIV-2 1 RESULT ANTBDY: CPT | Performed by: FAMILY MEDICINE

## 2025-02-10 PROCEDURE — 86593 SYPHILIS TEST NON-TREP QUANT: CPT | Performed by: FAMILY MEDICINE

## 2025-02-10 PROCEDURE — 80053 COMPREHEN METABOLIC PANEL: CPT | Performed by: FAMILY MEDICINE

## 2025-02-10 PROCEDURE — 36415 COLL VENOUS BLD VENIPUNCTURE: CPT | Performed by: FAMILY MEDICINE

## 2025-02-10 PROCEDURE — 86803 HEPATITIS C AB TEST: CPT | Performed by: FAMILY MEDICINE

## 2025-02-10 PROCEDURE — 87591 N.GONORRHOEAE DNA AMP PROB: CPT | Performed by: FAMILY MEDICINE

## 2025-02-10 RX ORDER — EMTRICITABINE AND TENOFOVIR ALAFENAMIDE 200; 25 MG/1; MG/1
1 TABLET ORAL DAILY
Qty: 30 TABLET | Refills: 2 | Status: SHIPPED | OUTPATIENT
Start: 2025-02-10

## 2025-02-10 NOTE — PROGRESS NOTES
Patient Name: Arnel Edmond    : 1995  MRN: [id number]    The patient location is: PrEP  The chief complaint leading to consultation is: hiv prep     Visit type: audiovisual    Face to Face time with patient: 7 minutes  9 minutes of total time spent on the encounter, which includes face to face time and non-face to face time preparing to see the patient (eg, review of tests), Obtaining and/or reviewing separately obtained history, Documenting clinical information in the electronic or other health record, Independently interpreting results (not separately reported) and communicating results to the patient/family/caregiver, or Care coordination (not separately reported).     Each patient to whom he or she provides medical services by telemedicine is:  (1) informed of the relationship between the physician and patient and the respective role of any other health care provider with respect to management of the patient; and (2) notified that he or she may decline to receive medical services by telemedicine and may withdraw from such care at any time.    Notes:     Subjective:     Patient ID: Arnel is a 29 y.o. male    Chief Complaint:  hiv prep    History of Present Illness    Arnel presents today for PrEP follow-up    He is currently taking Discovy for PrEP with no reported side effects after one month of use. He continues Wellbutrin 300 and required Air Supra only during the first week following last visit.      ROS:  General: -fever, -chills, -fatigue, -weight gain, -weight loss  Eyes: -vision changes, -redness, -discharge  ENT: -ear pain, -nasal congestion, -sore throat  Cardiovascular: -chest pain, -palpitations, -lower extremity edema  Respiratory: -cough, -shortness of breath  Gastrointestinal: -abdominal pain, -nausea, -vomiting, -diarrhea, -constipation, -blood in stool  Genitourinary: -dysuria, -hematuria, -frequency  Musculoskeletal: -joint pain, -muscle pain  Skin: -rash, -lesion  Neurological:  -headache, -dizziness, -numbness, -tingling  Psychiatric: -anxiety, -depression, -sleep difficulty         Objective:   There were no vitals taken for this visit.      Physical Exam  Pulmonary:      Effort: Pulmonary effort is normal.   Neurological:      Mental Status: He is alert.   Psychiatric:         Mood and Affect: Mood normal.         Behavior: Behavior normal.       Lab Visit on 02/10/2025   Component Date Value Ref Range Status    Chlamydia, Amplified DNA 02/10/2025 Not Detected  Not Detected Final    N gonorrhoeae, amplified DNA 02/10/2025 Not Detected  Not Detected Final   Lab Visit on 02/10/2025   Component Date Value Ref Range Status    Sodium 02/10/2025 140  136 - 145 mmol/L Final    Potassium 02/10/2025 4.1  3.5 - 5.1 mmol/L Final    Chloride 02/10/2025 108  95 - 110 mmol/L Final    CO2 02/10/2025 24  23 - 29 mmol/L Final    Glucose 02/10/2025 94  70 - 110 mg/dL Final    BUN 02/10/2025 13  6 - 20 mg/dL Final    Creatinine 02/10/2025 0.9  0.5 - 1.4 mg/dL Final    Calcium 02/10/2025 9.2  8.7 - 10.5 mg/dL Final    Total Protein 02/10/2025 7.1  6.0 - 8.4 g/dL Final    Albumin 02/10/2025 4.0  3.5 - 5.2 g/dL Final    Total Bilirubin 02/10/2025 0.6  0.1 - 1.0 mg/dL Final    Comment: For infants and newborns, interpretation of results should be based  on gestational age, weight and in agreement with clinical  observations.    Premature Infant recommended reference ranges:  Up to 24 hours.............<8.0 mg/dL  Up to 48 hours............<12.0 mg/dL  3-5 days..................<15.0 mg/dL  6-29 days.................<15.0 mg/dL      Alkaline Phosphatase 02/10/2025 80  40 - 150 U/L Final    AST 02/10/2025 22  10 - 40 U/L Final    ALT 02/10/2025 18  10 - 44 U/L Final    eGFR 02/10/2025 >60  >60 mL/min/1.73 m^2 Final    Anion Gap 02/10/2025 8  8 - 16 mmol/L Final    C. trach. RNA Source 02/10/2025 Throat   Final    C.trach. Misc. Amp RNA 02/10/2025 Negative  Negative Final    Comment: -------------------ADDITIONAL  INFORMATION-------------------  This report is intended for use in clinical monitoring   and management of patients. It is not intended for use   in medical-legal applications.       Tammie RNA Source 02/10/2025 Throat   Final    Whit Cho. Amp RNA 02/10/2025 Negative  Negative Final    Comment: -------------------ADDITIONAL INFORMATION-------------------  This report is intended for use in clinical monitoring   and management of patients. It is not intended for use   in medical-legal applications.     Test Performed by:  [place] [id number]  : Deuce Evans Ph.D.; CLIA# [id number]      SHELTONReema wagner. RNA Source 02/10/2025 Rectal   Final    Reina Misc. Amp RNA 02/10/2025 Negative  Negative Final    Comment: -------------------ADDITIONAL INFORMATION-------------------  This report is intended for use in clinical monitoring   and management of patients. It is not intended for use   in medical-legal applications.       Tammie RNA Source 02/10/2025 Rectal   Final    Whit Cho. Amp RNA 02/10/2025 Negative  Negative Final    Comment: -------------------ADDITIONAL INFORMATION-------------------  This report is intended for use in clinical monitoring   and management of patients. It is not intended for use   in medical-legal applications.     Test Performed by:  [place] [id number]  : Deuce Evans Ph.D.; CLIA# [id number]      Hepatitis C Ab 02/10/2025 Negative  Negative Final    Treponema Pallidum Antibodies (IgG* 02/10/2025 Nonreactive  Nonreactive Final    HIV Rapid Testing 02/10/2025 Non-Reactive  Negative Final    Interpretation: Negative for HIV-1 and HIV-2 antibodies.         Assessment        ICD-10-CM ICD-9-CM   1. Encounter for HIV pre-exposure prophylaxis  Z29.81 V82.89   2. Contact with and  (suspected) exposure to human immunodeficiency virus (hiv)  Z20.6 V01.79   3. Contact with and (suspected) exposure to infections with a predominantly sexual mode of transmission  Z20.2 V01.6   4. Drug therapy  Z79.899 V58.69   5. Screening for human immunodeficiency virus  Z11.4 V73.89   6. Screen for sexually transmitted diseases  Z11.3 V74.5         Plan:     Assessment & Plan    IMPRESSION:  Reviewed lab results, including rapid HIV negative and metabolic panel, with no concerns noted  Assessed current medication regimen, including PrEP (Discovy), Wellbutrin 300, and Air Supra as needed  Considered addition of doxycycline for STI prevention (doxy PEP) but deferred decision to next appointment    PRE-EXPOSURE PROPHYLAXIS (PREP):  Patient reports good compliance with HIV pre exposure prophylaxis medication.  No side effects were reported.  Tolerating medication well.    Encouraged good compliance with medication and safe sex with all sexual activity.    Continue monitoring labs for STI exposures including syphilis, gonorrhea, chlamydia, HIV, hepatitis-C, as well as potential medication side effects.    Labs to be done every three months and we can do a virtual visit every six months.   Patient is to message me if any concerns with medications, or any new symptoms prior to scheduled visits.         ORDERS  1. Encounter for HIV pre-exposure prophylaxis  -     emtricitabine-tenofovir alafen (DESCOVY) 200-25 mg Tab; Take 1 tablet by mouth once daily.  Dispense: 30 tablet; Refill: 2  -     Comprehensive Metabolic Panel; Standing  -     C. trachomatis/N. gonorrhoeae by AMP DNA; Standing  -     C.trach/N.gonor AMP RNA; Standing  -     C.trach/N.gonor AMP RNA; Standing  -     Hepatitis C Antibody; Standing  -     Treponema Pallidium Antibodies IgG, IgM; Standing  -     Cancel: HIV RNA, Quantitative, PCR; Future; Expected date: 02/17/2025  -     Rapid HIV; Standing    2. Contact with and (suspected) exposure to human  immunodeficiency virus (hiv)  -     emtricitabine-tenofovir alafen (DESCOVY) 200-25 mg Tab; Take 1 tablet by mouth once daily.  Dispense: 30 tablet; Refill: 2  -     Comprehensive Metabolic Panel; Standing  -     C. trachomatis/N. gonorrhoeae by AMP DNA; Standing  -     C.trach/N.gonor AMP RNA; Standing  -     C.trach/N.gonor AMP RNA; Standing  -     Hepatitis C Antibody; Standing  -     Treponema Pallidium Antibodies IgG, IgM; Standing  -     Cancel: HIV RNA, Quantitative, PCR; Future; Expected date: 02/17/2025  -     Rapid HIV; Standing    3. Contact with and (suspected) exposure to infections with a predominantly sexual mode of transmission  -     Comprehensive Metabolic Panel; Standing  -     C. trachomatis/N. gonorrhoeae by AMP DNA; Standing  -     C.trach/N.gonor AMP RNA; Standing  -     C.trach/N.gonor AMP RNA; Standing  -     Hepatitis C Antibody; Standing  -     Treponema Pallidium Antibodies IgG, IgM; Standing  -     Cancel: HIV RNA, Quantitative, PCR; Future; Expected date: 02/17/2025  -     Rapid HIV; Standing    4. Drug therapy  -     Comprehensive Metabolic Panel; Standing  -     C. trachomatis/N. gonorrhoeae by AMP DNA; Standing  -     C.trach/N.gonor AMP RNA; Standing  -     C.trach/N.gonor AMP RNA; Standing  -     Hepatitis C Antibody; Standing  -     Treponema Pallidium Antibodies IgG, IgM; Standing  -     Cancel: HIV RNA, Quantitative, PCR; Future; Expected date: 02/17/2025  -     Rapid HIV; Standing    5. Screening for human immunodeficiency virus  -     Comprehensive Metabolic Panel; Standing  -     C. trachomatis/N. gonorrhoeae by AMP DNA; Standing  -     C.trach/N.gonor AMP RNA; Standing  -     C.trach/N.gonor AMP RNA; Standing  -     Hepatitis C Antibody; Standing  -     Treponema Pallidium Antibodies IgG, IgM; Standing  -     Cancel: HIV RNA, Quantitative, PCR; Future; Expected date: 02/17/2025  -     Rapid HIV; Standing    6. Screen for sexually transmitted diseases  -     Comprehensive  Metabolic Panel; Standing  -     C. trachomatis/N. gonorrhoeae by AMP DNA; Standing  -     C.trach/N.gonor AMP RNA; Standing  -     C.trach/N.gonor AMP RNA; Standing  -     Hepatitis C Antibody; Standing  -     Treponema Pallidium Antibodies IgG, IgM; Standing  -     Cancel: HIV RNA, Quantitative, PCR; Future; Expected date: 02/17/2025  -     Rapid HIV; Standing             -Paul Matute Jr., MD, AAHIVS      This note was generated with the assistance of ambient listening technology. Verbal consent was obtained by the patient and accompanying visitor(s) for the recording of patient appointment to facilitate this note. I attest to having reviewed and edited the generated note for accuracy, though some syntax or spelling errors may persist. Please contact the author of this note for any clarification.          There are no Patient Instructions on file for this visit.    Follow Up  No follow-ups on file.    Future Appointments   Date Time Provider Department [place]   5/8/2025  8:15 AM LAB, [facility]   5/8/2025  8:30 AM LAB, [facility]   8/11/2025  8:00 AM LAB, [facility]   8/11/2025  8:15 AM LAB, [facility]   8/18/2025  3:20 PM Paul Matute Jr., MD [facility]

## 2025-04-01 ENCOUNTER — PATIENT MESSAGE (OUTPATIENT)
Dept: FAMILY MEDICINE | Facility: CLINIC | Age: 30
End: 2025-04-01

## 2025-04-01 ENCOUNTER — OFFICE VISIT (OUTPATIENT)
Dept: FAMILY MEDICINE | Facility: CLINIC | Age: 30
End: 2025-04-01
Payer: COMMERCIAL

## 2025-04-01 DIAGNOSIS — N52.9 ERECTILE DYSFUNCTION, UNSPECIFIED ERECTILE DYSFUNCTION TYPE: Primary | ICD-10-CM

## 2025-04-01 PROCEDURE — 98005 SYNCH AUDIO-VIDEO EST LOW 20: CPT | Mod: 95,,, | Performed by: FAMILY MEDICINE

## 2025-04-01 PROCEDURE — 1159F MED LIST DOCD IN RCRD: CPT | Mod: CPTII,95,, | Performed by: FAMILY MEDICINE

## 2025-04-01 PROCEDURE — 1160F RVW MEDS BY RX/DR IN RCRD: CPT | Mod: CPTII,95,, | Performed by: FAMILY MEDICINE

## 2025-04-01 RX ORDER — TADALAFIL 20 MG/1
20 TABLET ORAL
Qty: 30 TABLET | Refills: 3 | Status: SHIPPED | OUTPATIENT
Start: 2025-04-01 | End: 2026-04-01

## 2025-04-01 NOTE — PROGRESS NOTES
Patient Name: Arnel Edmond    : 1995  MRN: [id number]    The patient location is: [place], [place]  The chief complaint leading to consultation is: Erectile Dysfunction       Visit type: audiovisual    Face to Face time with patient: 15 minutes  20 minutes of total time spent on the encounter, which includes face to face time and non-face to face time preparing to see the patient (eg, review of tests), Obtaining and/or reviewing separately obtained history, Documenting clinical information in the electronic or other health record, Independently interpreting results (not separately reported) and communicating results to the patient/family/caregiver, or Care coordination (not separately reported).     Each patient to whom he or she provides medical services by telemedicine is:  (1) informed of the relationship between the physician and patient and the respective role of any other health care provider with respect to management of the patient; and (2) notified that he or she may decline to receive medical services by telemedicine and may withdraw from such care at any time.    Notes:     Subjective:     Patient ID: Arnel is a 29 y.o. male    Chief Complaint:  Erectile Dysfunction    History of Present Illness    Arnel presents today to discuss erectile dysfunction medication options    He reports erectile dysfunction occurring in 25-33% of sexual encounters. Erection strength is typically 70-75% of optimal function, with complete loss of erection occurring in 10% of encounters. He notes mood affects erectile function. Current medications Wellbutrin and Descovy may be contributing factors.    He takes Wellbutrin, Descovy, and AirSupra for asthma management.    ROS:  General: -fever, -chills, -fatigue, -weight gain, -weight loss  Eyes: -vision changes, -redness, -discharge  ENT: -ear pain, -nasal congestion, -sore throat  Cardiovascular: -chest pain, -palpitations, -lower extremity edema  Respiratory: -cough,  -shortness of breath  Gastrointestinal: -abdominal pain, -nausea, -vomiting, -diarrhea, -constipation, -blood in stool  Genitourinary: -dysuria, -hematuria, -frequency  Musculoskeletal: -joint pain, -muscle pain  Skin: -rash, -lesion  Neurological: -headache, -dizziness, -numbness, -tingling  Psychiatric: -anxiety, -depression, -sleep difficulty  Male Genitourinary: +erectile dysfunction       Objective:   There were no vitals taken for this visit.      Physical Exam  Pulmonary:      Effort: Pulmonary effort is normal.   Psychiatric:         Mood and Affect: Mood normal.         Behavior: Behavior normal.            Assessment        ICD-10-CM ICD-9-CM   1. Erectile dysfunction, unspecified erectile dysfunction type  N52.9 607.84         Plan:     Assessment & Plan    IMPRESSION:  Recommend Cialis for ED treatment over Viagra due to longer duration of effect (up to 72 hours), fewer side effects particularly headaches, and no food restrictions for administration.  Considered current medications (Wellbutrin and Descovy) as potential contributors to ED.  Advised against concurrent use of poppers with Cialis due to risk of significant hypotension, with a minimum 72-hour separation.    ERECTILE DYSFUNCTION:  Explained differences between Viagra and Cialis, including onset of action, duration, and common side effects.  Prescribed Cialis 20 mg as needed for ED, with potential duration of effect up to 72 hours.  Provided a [employer] discount card to reduce the cost of the medication.  Warned against using poppers with ED medication due to potential severe side effects.           ORDERS  1. Erectile dysfunction, unspecified erectile dysfunction type  -     tadalafiL (CIALIS) 20 MG Tab; Take 1 tablet (20 mg total) by mouth every 72 hours as needed (erectile dysfunction).  Dispense: 30 tablet; Refill: 3             -Paul Matute Jr., MD, AAHIVS      This note was generated with the assistance of ambient listening technology.  Verbal consent was obtained by the patient and accompanying visitor(s) for the recording of patient appointment to facilitate this note. I attest to having reviewed and edited the generated note for accuracy, though some syntax or spelling errors may persist. Please contact the author of this note for any clarification.          There are no Patient Instructions on file for this visit.    Follow Up  No follow-ups on file.    Future Appointments   Date Time Provider Department [place]   5/8/2025  8:15 AM [facility]   5/8/2025  8:30 AM LAB, [facility]   8/11/2025  8:00 AM LAB, [facility]   8/11/2025  8:15 AM LAB, [facility]   8/18/2025  3:20 PM Paul Matute Jr., MD [facility]

## 2025-04-25 ENCOUNTER — NURSE TRIAGE (OUTPATIENT)
Dept: ADMINISTRATIVE | Facility: CLINIC | Age: 30
End: 2025-04-25
Payer: COMMERCIAL

## 2025-04-25 NOTE — TELEPHONE ENCOUNTER
Arnel c/o diarrhea x 3 days, related to possible food poisoning. Symptoms have worsened within the past 12 hours  Reports 7 episodes of Diarrhea since last night. Took 2 Imodium tablets last night. Denies fever at this time. Advised Arnel per triage protocol to see a physician within next 24 hours. No available appts with PCP. Instructed pt to go to nearby  today for physician eval. Home care and call back instructions provided per triage protocol. V/u.

## 2025-04-25 NOTE — TELEPHONE ENCOUNTER
Reason for Disposition   [1] SEVERE diarrhea (e.g., 7 or more times / day more than normal) AND [2] present > 24 hours (1 day)    Additional Information   Negative: Shock suspected (e.g., cold/pale/clammy skin, too weak to stand, low BP, rapid pulse)   Negative: Difficult to awaken or acting confused (e.g., disoriented, slurred speech)   Negative: Sounds like a life-threatening emergency to the triager   Negative: [1] SEVERE abdominal pain (e.g., excruciating) AND [2] present > 1 hour   Negative: [1] SEVERE abdominal pain AND [2] age > 60 years   Negative: [1] Blood in the stool AND [2] moderate or large amount of blood   Negative: Black or tarry bowel movements  (Exception: Chronic-unchanged black-grey BMs AND is taking iron pills or Pepto-Bismol.)   Negative: [1] Drinking very little AND [2] dehydration suspected (e.g., no urine > 12 hours, very dry mouth, very lightheaded)   Negative: Patient sounds very sick or weak to the triager   Negative: [1] SEVERE diarrhea (e.g., 7 or more times / day more than normal) AND [2] age > 60 years   Negative: [1] Constant abdominal pain AND [2] present > 2 hours   Negative: [1] Fever > 103 F (39.4 C) AND [2] not able to get the fever down using Fever Care Advice    Protocols used: Diarrhea-A-

## 2025-05-08 ENCOUNTER — LAB VISIT (OUTPATIENT)
Dept: LAB | Facility: OTHER | Age: 30
End: 2025-05-08
Attending: FAMILY MEDICINE
Payer: COMMERCIAL

## 2025-05-08 DIAGNOSIS — Z20.6 CONTACT WITH AND (SUSPECTED) EXPOSURE TO HUMAN IMMUNODEFICIENCY VIRUS (HIV): ICD-10-CM

## 2025-05-08 DIAGNOSIS — Z11.4 SCREENING FOR HUMAN IMMUNODEFICIENCY VIRUS: ICD-10-CM

## 2025-05-08 DIAGNOSIS — Z29.81 ENCOUNTER FOR HIV PRE-EXPOSURE PROPHYLAXIS: ICD-10-CM

## 2025-05-08 DIAGNOSIS — Z20.2 CONTACT WITH AND (SUSPECTED) EXPOSURE TO INFECTIONS WITH A PREDOMINANTLY SEXUAL MODE OF TRANSMISSION: ICD-10-CM

## 2025-05-08 DIAGNOSIS — Z79.899 DRUG THERAPY: ICD-10-CM

## 2025-05-08 DIAGNOSIS — Z11.3 SCREEN FOR SEXUALLY TRANSMITTED DISEASES: ICD-10-CM

## 2025-05-08 LAB
ALBUMIN SERPL BCP-MCNC: 4.1 G/DL (ref 3.5–5.2)
ALP SERPL-CCNC: 60 UNIT/L (ref 40–150)
ALT SERPL W/O P-5'-P-CCNC: 21 UNIT/L (ref 10–44)
ANION GAP (OHS): 11 MMOL/L (ref 8–16)
AST SERPL-CCNC: 26 UNIT/L (ref 11–45)
BILIRUB SERPL-MCNC: 1.4 MG/DL (ref 0.1–1)
BUN SERPL-MCNC: 10 MG/DL (ref 6–20)
CALCIUM SERPL-MCNC: 9.4 MG/DL (ref 8.7–10.5)
CHLORIDE SERPL-SCNC: 107 MMOL/L (ref 95–110)
CO2 SERPL-SCNC: 24 MMOL/L (ref 23–29)
CREAT SERPL-MCNC: 0.9 MG/DL (ref 0.5–1.4)
GFR SERPLBLD CREATININE-BSD FMLA CKD-EPI: >60 ML/MIN/1.73/M2
GLUCOSE SERPL-MCNC: 82 MG/DL (ref 70–110)
HCV AB SERPL QL IA: NEGATIVE
HIV1+2 IGG SERPL QL IA.RAPID: NORMAL
POTASSIUM SERPL-SCNC: 4.2 MMOL/L (ref 3.5–5.1)
PROT SERPL-MCNC: 7.3 GM/DL (ref 6–8.4)
SODIUM SERPL-SCNC: 142 MMOL/L (ref 136–145)
T PALLIDUM IGG+IGM SER QL: NEGATIVE

## 2025-05-08 PROCEDURE — 86803 HEPATITIS C AB TEST: CPT | Mod: SK

## 2025-05-08 PROCEDURE — 36415 COLL VENOUS BLD VENIPUNCTURE: CPT | Mod: SK

## 2025-05-08 PROCEDURE — 86703 HIV-1/HIV-2 1 RESULT ANTBDY: CPT | Mod: SK

## 2025-05-08 PROCEDURE — 84075 ASSAY ALKALINE PHOSPHATASE: CPT | Mod: SK

## 2025-05-08 PROCEDURE — 86593 SYPHILIS TEST NON-TREP QUANT: CPT | Mod: SK

## 2025-05-18 ENCOUNTER — RESULTS FOLLOW-UP (OUTPATIENT)
Dept: FAMILY MEDICINE | Facility: CLINIC | Age: 30
End: 2025-05-18
Payer: COMMERCIAL

## 2025-08-11 ENCOUNTER — LAB VISIT (OUTPATIENT)
Dept: LAB | Facility: OTHER | Age: 30
End: 2025-08-11
Attending: FAMILY MEDICINE
Payer: COMMERCIAL

## 2025-08-11 DIAGNOSIS — Z11.4 SCREENING FOR HUMAN IMMUNODEFICIENCY VIRUS: ICD-10-CM

## 2025-08-11 DIAGNOSIS — Z79.899 DRUG THERAPY: ICD-10-CM

## 2025-08-11 DIAGNOSIS — Z20.2 CONTACT WITH AND (SUSPECTED) EXPOSURE TO INFECTIONS WITH A PREDOMINANTLY SEXUAL MODE OF TRANSMISSION: ICD-10-CM

## 2025-08-11 DIAGNOSIS — Z11.3 SCREEN FOR SEXUALLY TRANSMITTED DISEASES: ICD-10-CM

## 2025-08-11 DIAGNOSIS — Z20.6 CONTACT WITH AND (SUSPECTED) EXPOSURE TO HUMAN IMMUNODEFICIENCY VIRUS (HIV): ICD-10-CM

## 2025-08-11 DIAGNOSIS — Z29.81 ENCOUNTER FOR HIV PRE-EXPOSURE PROPHYLAXIS: ICD-10-CM

## 2025-08-11 LAB
HCV AB SERPL QL IA: NORMAL
HIV1+2 IGG SERPL QL IA.RAPID: NORMAL
T PALLIDUM IGG+IGM SER QL: NORMAL

## 2025-08-11 PROCEDURE — 36415 COLL VENOUS BLD VENIPUNCTURE: CPT | Mod: SK

## 2025-08-11 PROCEDURE — 86803 HEPATITIS C AB TEST: CPT | Mod: SK

## 2025-08-11 PROCEDURE — 86703 HIV-1/HIV-2 1 RESULT ANTBDY: CPT | Mod: SK

## 2025-08-11 PROCEDURE — 86593 SYPHILIS TEST NON-TREP QUANT: CPT | Mod: SK

## 2025-08-18 ENCOUNTER — PATIENT MESSAGE (OUTPATIENT)
Dept: FAMILY MEDICINE | Facility: CLINIC | Age: 30
End: 2025-08-18

## 2025-08-18 ENCOUNTER — OFFICE VISIT (OUTPATIENT)
Dept: FAMILY MEDICINE | Facility: CLINIC | Age: 30
End: 2025-08-18
Payer: COMMERCIAL

## 2025-08-18 DIAGNOSIS — R41.840 ATTENTION OR CONCENTRATION DEFICIT: ICD-10-CM

## 2025-08-18 DIAGNOSIS — F39 MOOD DISORDER: ICD-10-CM

## 2025-08-18 PROCEDURE — 1160F RVW MEDS BY RX/DR IN RCRD: CPT | Mod: CPTII,95,, | Performed by: FAMILY MEDICINE

## 2025-08-18 PROCEDURE — 98005 SYNCH AUDIO-VIDEO EST LOW 20: CPT | Mod: 95,,, | Performed by: FAMILY MEDICINE

## 2025-08-18 PROCEDURE — 1159F MED LIST DOCD IN RCRD: CPT | Mod: CPTII,95,, | Performed by: FAMILY MEDICINE

## 2025-08-18 RX ORDER — BUPROPION HYDROCHLORIDE 300 MG/1
300 TABLET ORAL DAILY
Qty: 90 TABLET | Refills: 3 | Status: SHIPPED | OUTPATIENT
Start: 2025-08-18

## 2025-09-05 ENCOUNTER — NURSE TRIAGE (OUTPATIENT)
Dept: ADMINISTRATIVE | Facility: CLINIC | Age: 30
End: 2025-09-05
Payer: COMMERCIAL